# Patient Record
Sex: MALE | Race: WHITE | NOT HISPANIC OR LATINO | Employment: FULL TIME | URBAN - METROPOLITAN AREA
[De-identification: names, ages, dates, MRNs, and addresses within clinical notes are randomized per-mention and may not be internally consistent; named-entity substitution may affect disease eponyms.]

---

## 2017-01-03 ENCOUNTER — HOSPITAL ENCOUNTER (OUTPATIENT)
Dept: RADIOLOGY | Facility: HOSPITAL | Age: 72
Discharge: HOME | End: 2017-01-03
Attending: SURGERY
Payer: MEDICARE

## 2017-01-03 ENCOUNTER — OFFICE VISIT (OUTPATIENT)
Dept: SURGERY | Facility: CLINIC | Age: 72
End: 2017-01-03
Payer: MEDICARE

## 2017-01-03 VITALS
SYSTOLIC BLOOD PRESSURE: 120 MMHG | HEART RATE: 56 BPM | BODY MASS INDEX: 32.73 KG/M2 | WEIGHT: 255 LBS | TEMPERATURE: 99 F | DIASTOLIC BLOOD PRESSURE: 74 MMHG | HEIGHT: 74 IN

## 2017-01-03 DIAGNOSIS — K42.9 UMBILICAL HERNIA WITHOUT OBSTRUCTION AND WITHOUT GANGRENE: ICD-10-CM

## 2017-01-03 DIAGNOSIS — N18.30 CHRONIC KIDNEY DISEASE, STAGE III (MODERATE): ICD-10-CM

## 2017-01-03 DIAGNOSIS — I10 ESSENTIAL HYPERTENSION: ICD-10-CM

## 2017-01-03 DIAGNOSIS — Z01.818 PREOP EXAMINATION: ICD-10-CM

## 2017-01-03 DIAGNOSIS — K40.90 LEFT INGUINAL HERNIA: ICD-10-CM

## 2017-01-03 DIAGNOSIS — E78.5 HYPERLIPIDEMIA, UNSPECIFIED HYPERLIPIDEMIA TYPE: ICD-10-CM

## 2017-01-03 DIAGNOSIS — Z01.818 PREOP EXAMINATION: Primary | ICD-10-CM

## 2017-01-03 PROCEDURE — 71020 XR CHEST PA AND LATERAL: CPT | Mod: TC

## 2017-01-03 PROCEDURE — 1159F MED LIST DOCD IN RCRD: CPT | Mod: S$GLB,,, | Performed by: SURGERY

## 2017-01-03 PROCEDURE — 3074F SYST BP LT 130 MM HG: CPT | Mod: S$GLB,,, | Performed by: SURGERY

## 2017-01-03 PROCEDURE — 99203 OFFICE O/P NEW LOW 30 MIN: CPT | Mod: S$GLB,,, | Performed by: SURGERY

## 2017-01-03 PROCEDURE — 99999 PR PBB SHADOW E&M-NEW PATIENT-LVL IV: CPT | Mod: PBBFAC,,, | Performed by: SURGERY

## 2017-01-03 PROCEDURE — 1157F ADVNC CARE PLAN IN RCRD: CPT | Mod: S$GLB,,, | Performed by: SURGERY

## 2017-01-03 PROCEDURE — 1160F RVW MEDS BY RX/DR IN RCRD: CPT | Mod: S$GLB,,, | Performed by: SURGERY

## 2017-01-03 PROCEDURE — 3078F DIAST BP <80 MM HG: CPT | Mod: S$GLB,,, | Performed by: SURGERY

## 2017-01-03 PROCEDURE — 1126F AMNT PAIN NOTED NONE PRSNT: CPT | Mod: S$GLB,,, | Performed by: SURGERY

## 2017-01-03 PROCEDURE — 71020 XR CHEST PA AND LATERAL: CPT | Mod: 26,,, | Performed by: RADIOLOGY

## 2017-01-03 RX ORDER — SIMVASTATIN 40 MG/1
40 TABLET, FILM COATED ORAL EVERY MORNING
COMMUNITY

## 2017-01-03 RX ORDER — ATENOLOL 25 MG/1
TABLET ORAL
Refills: 1 | COMMUNITY
Start: 2016-12-23

## 2017-01-03 RX ORDER — IRBESARTAN 150 MG/1
TABLET ORAL
Refills: 0 | COMMUNITY
Start: 2016-11-21

## 2017-01-03 RX ORDER — HYDROCHLOROTHIAZIDE 25 MG/1
TABLET ORAL
Refills: 1 | COMMUNITY
Start: 2016-12-08

## 2017-01-03 NOTE — PROGRESS NOTES
History & Physical    SUBJECTIVE:     History of Present Illness:  Patient is a 71 y.o. male presents with left inguinal and umbilical hernias.  The umbilical hernia has been there for years and he noticed his lih 2 years ago.  He does get burning pain in his left groin on straining and relieved with rest.  It swells up when he eats.      Chief Complaint   Patient presents with    Hernia     LIH        Review of patient's allergies indicates:  No Known Allergies    Current Outpatient Prescriptions   Medication Sig Dispense Refill    atenolol (TENORMIN) 25 MG tablet TAKE 1 TABLET BY ORAL ROUTE 1 TIME PER DAY FOR BLOOD PRESSURE  1    hydrochlorothiazide (HYDRODIURIL) 25 MG tablet TAKE 1 TABLET (25 MG) BY ORAL ROUTE ONCE DAILY  1    irbesartan (AVAPRO) 150 MG tablet TAKE 1 TABLET BY MOUTH 1 TIME PER DAY.  0    simvastatin (ZOCOR) 40 MG tablet Take 40 mg by mouth every evening.       No current facility-administered medications for this visit.        Past Medical History   Diagnosis Date    Disorder of kidney and ureter 2017     Stage 3a CKD    Hyperlipidemia     Hypertension      History reviewed. No pertinent past surgical history.  Family History   Problem Relation Age of Onset    Cancer Mother     Cancer Father      Social History   Substance Use Topics    Smoking status: Never Smoker    Smokeless tobacco: None    Alcohol use None        Review of Systems:  Review of Systems   Constitutional: Positive for unexpected weight change. Negative for fever.        Has weight gain   Respiratory: Negative for cough, chest tightness and shortness of breath.         Can climb a flight of stairs and walks a lot (walks his dogs)   Cardiovascular: Negative for chest pain.   Gastrointestinal: Negative for abdominal pain, constipation, diarrhea, nausea and vomiting.   Genitourinary: Negative for difficulty urinating and dysuria.        2 times notcuria   Musculoskeletal: Positive for back pain.   Skin: Negative for  "rash and wound.   Neurological: Negative for seizures and headaches.   Hematological: Does not bruise/bleed easily.       OBJECTIVE:     Vital Signs (Most Recent)  Temp: 98.5 °F (36.9 °C) (01/03/17 0703)  Pulse: (!) 56 (01/03/17 0703)  BP: 120/74 (01/03/17 0703)  6' 2" (1.88 m)  115.7 kg (255 lb)     Physical Exam:  Physical Exam   Constitutional: He is oriented to person, place, and time. He appears well-developed and well-nourished.   Neck: Normal range of motion. Neck supple.   Cardiovascular: Normal rate, regular rhythm and normal heart sounds.    Pulmonary/Chest: Effort normal and breath sounds normal.   Abdominal: Soft. Normal appearance and bowel sounds are normal. There is no tenderness. A hernia is present. Hernia confirmed positive in the left inguinal area. Hernia confirmed negative in the right inguinal area.       Genitourinary: Testes normal.         Neurological: He is alert and oriented to person, place, and time.   Skin: Skin is warm and dry.   Psychiatric: He has a normal mood and affect. His behavior is normal. Judgment and thought content normal.   Vitals reviewed.      Laboratory  None available    Diagnostic Results:  None available    ASSESSMENT/PLAN:     Scrotal lih, incarcerated.  Moderate umbilical hernia.    PLAN:Plan     Open repair of both hernias with mesh.  Stress test and labs.       "

## 2017-01-03 NOTE — LETTER
[facility] - General Surgery  [address]  [place] [address]  Phone: [phone] January 4, 2017      Arben Kelly MD  [address] [facility] [address]    Patient: Gibson Moreno   MR Number: [id number]   YOB: 1945   Date of Visit: 1/3/2017     Dear Dr. Kelly:    Thank you for referring Gibson Moreno to me for evaluation. Below are the relevant portions of my assessment and plan of care.    Patient presents scrotal LIH, incarcerated. Moderate umbilical hernia.     PLAN:   -  Open repair of both hernias with mesh  -  Stress test and labs     If you have questions, please do not hesitate to call me. I look forward to following Gibson along with you.    Sincerely,      Brandan Watts MD  Section Head - General, Laparoscopic, Bariatric  Acute Care and Oncologic Surgery   - Surgical Weight Loss Program  [facility]    CHING/yessica

## 2017-01-03 NOTE — LETTER
January 3, 2017      Arben Kelly MD  [address] [facility] [address]           [facility] General Surgery  [address]  [place] [address]  Phone: [phone]          Patient: Gibson Moreno   MR Number: [id number]   YOB: 1945   Date of Visit: 1/3/2017       Dear Dr. Arben Kelly:    Thank you for referring Gibson Moreno to me for evaluation. Attached you will find relevant portions of my assessment and plan of care.    If you have questions, please do not hesitate to call me. I look forward to following Gibson Moreno along with you.    Sincerely,    Brandan Watts MD    Enclosure  CC:  No Recipients    If you would like to receive this communication electronically, please contact [email or web address] or [phone] to request more information on [employer] access.    For providers and/or their staff who would like to refer a patient to [facility], please contact us through our one-stop-shop provider referral line, [facility] , at [phone].    If you feel you have received this communication in error or would no longer like to receive these types of communications, please e-mail [email or web address]

## 2017-01-04 ENCOUNTER — TELEPHONE (OUTPATIENT)
Dept: SURGERY | Facility: CLINIC | Age: 72
End: 2017-01-04

## 2017-01-04 NOTE — TELEPHONE ENCOUNTER
----- Message from Brandan Watts MD sent at 1/4/2017  6:26 AM CST -----  Please alert this man's pcp regarding his elevated creatinine.  I am not sure if this is new or old.

## 2017-01-04 NOTE — TELEPHONE ENCOUNTER
Spoke to patient regarding his creatinine levels being slightly elevated and that we will forward his results to Dr. Kelly for his review. Pt V/U. That he may also need to schedule an visit prior to his surgery for clearance.

## 2017-01-10 ENCOUNTER — HOSPITAL ENCOUNTER (OUTPATIENT)
Dept: CARDIOLOGY | Facility: CLINIC | Age: 72
Discharge: HOME | End: 2017-01-10
Payer: MEDICARE

## 2017-01-10 DIAGNOSIS — E78.5 HYPERLIPIDEMIA, UNSPECIFIED HYPERLIPIDEMIA TYPE: ICD-10-CM

## 2017-01-10 DIAGNOSIS — I10 ESSENTIAL HYPERTENSION: ICD-10-CM

## 2017-01-10 DIAGNOSIS — Z01.818 PREOP EXAMINATION: ICD-10-CM

## 2017-01-10 LAB
DIASTOLIC DYSFUNCTION: NO
RETIRED EF AND QEF - SEE NOTES: 60 (ref 55–65)

## 2017-01-10 PROCEDURE — 93351 STRESS TTE COMPLETE: CPT | Mod: S$GLB,,, | Performed by: INTERNAL MEDICINE

## 2017-01-10 PROCEDURE — 93321 DOPPLER ECHO F-UP/LMTD STD: CPT | Mod: S$GLB,,, | Performed by: INTERNAL MEDICINE

## 2017-01-10 PROCEDURE — 96372 THER/PROPH/DIAG INJ SC/IM: CPT | Mod: 59,S$GLB,, | Performed by: INTERNAL MEDICINE

## 2017-01-11 ENCOUNTER — TELEPHONE (OUTPATIENT)
Dept: SURGERY | Facility: CLINIC | Age: 72
End: 2017-01-11

## 2017-01-11 NOTE — TELEPHONE ENCOUNTER
----- Message from Evangelista Reynolds sent at 1/11/2017  8:02 AM CST -----  Pt needs some instructions on what to do regarding catheter that was placed last night. Pt was seen at [facility]. Please call pt regarding this at [phone]

## 2017-01-11 NOTE — TELEPHONE ENCOUNTER
Returned pt call-pt states after his stress test yesterday morning and that evening he started with urinary retention and ended up in the ER with a catheter.  Pt states he is wanting to know what to do since he leaves on a business trip tonight and won't be back until Friday.    Notified pt that i would talk with Dr. Watts and also try to get him set up with an appt today to see a urologist.    Pt verbalizes understanding and will call back with any other questions or concerns.

## 2017-01-13 ENCOUNTER — TELEPHONE (OUTPATIENT)
Dept: SURGERY | Facility: CLINIC | Age: 72
End: 2017-01-13

## 2017-01-15 RX ORDER — MULTIVITAMIN
1 TABLET ORAL EVERY MORNING
COMMUNITY

## 2017-01-15 RX ORDER — CIPROFLOXACIN 500 MG/1
500 TABLET ORAL 2 TIMES DAILY
COMMUNITY

## 2017-01-15 NOTE — PRE-PROCEDURE INSTRUCTIONS
Spoke with Patient.  NPO, medication, and pre-op instructions reviewed.  Denies previous problems with Anesthesia.  Stated that he had Urinary Retention after having a Stress Test and had to go to the ED where he had an in and out catheterization and was started on Cipro 500 mg twice a day.  Verbalized understanding of instructions.

## 2017-01-16 ENCOUNTER — SURGERY (OUTPATIENT)
Age: 72
End: 2017-01-16

## 2017-01-16 ENCOUNTER — ANESTHESIA (OUTPATIENT)
Dept: SURGERY | Facility: HOSPITAL | Age: 72
End: 2017-01-16
Payer: MEDICARE

## 2017-01-16 ENCOUNTER — ANESTHESIA EVENT (OUTPATIENT)
Dept: SURGERY | Facility: HOSPITAL | Age: 72
End: 2017-01-16
Payer: MEDICARE

## 2017-01-16 ENCOUNTER — HOSPITAL ENCOUNTER (OUTPATIENT)
Facility: HOSPITAL | Age: 72
Discharge: HOME | End: 2017-01-16
Attending: SURGERY | Admitting: SURGERY
Payer: MEDICARE

## 2017-01-16 VITALS
HEIGHT: 74 IN | DIASTOLIC BLOOD PRESSURE: 70 MMHG | WEIGHT: 255 LBS | TEMPERATURE: 98 F | RESPIRATION RATE: 14 BRPM | OXYGEN SATURATION: 99 % | HEART RATE: 56 BPM | SYSTOLIC BLOOD PRESSURE: 118 MMHG | BODY MASS INDEX: 32.73 KG/M2

## 2017-01-16 DIAGNOSIS — K40.90 INGUINAL HERNIA UNILATERAL, NON-RECURRENT: ICD-10-CM

## 2017-01-16 DIAGNOSIS — K40.90 LEFT INGUINAL HERNIA: Primary | ICD-10-CM

## 2017-01-16 DIAGNOSIS — K42.9 UMBILICAL HERNIA WITHOUT OBSTRUCTION AND WITHOUT GANGRENE: ICD-10-CM

## 2017-01-16 PROCEDURE — 25000003 PHARM REV CODE 250: Performed by: SURGERY

## 2017-01-16 PROCEDURE — 36000707: Performed by: SURGERY

## 2017-01-16 PROCEDURE — 63600175 PHARM REV CODE 636 W HCPCS: Performed by: ANESTHESIOLOGY

## 2017-01-16 PROCEDURE — 71000039 HC RECOVERY, EACH ADD'L HOUR: Performed by: SURGERY

## 2017-01-16 PROCEDURE — 36000706: Performed by: SURGERY

## 2017-01-16 PROCEDURE — D9220A PRA ANESTHESIA: Mod: ANES,,, | Performed by: ANESTHESIOLOGY

## 2017-01-16 PROCEDURE — D9220A PRA ANESTHESIA: Mod: CRNA,,, | Performed by: NURSE ANESTHETIST, CERTIFIED REGISTERED

## 2017-01-16 PROCEDURE — 37000008 HC ANESTHESIA 1ST 15 MINUTES: Performed by: SURGERY

## 2017-01-16 PROCEDURE — 25000003 PHARM REV CODE 250: Performed by: NURSE ANESTHETIST, CERTIFIED REGISTERED

## 2017-01-16 PROCEDURE — 71000016 HC POSTOP RECOV ADDL HR: Performed by: SURGERY

## 2017-01-16 PROCEDURE — 25000003 PHARM REV CODE 250: Performed by: ANESTHESIOLOGY

## 2017-01-16 PROCEDURE — 71000033 HC RECOVERY, INTIAL HOUR: Performed by: SURGERY

## 2017-01-16 PROCEDURE — 37000009 HC ANESTHESIA EA ADD 15 MINS: Performed by: SURGERY

## 2017-01-16 PROCEDURE — 63600175 PHARM REV CODE 636 W HCPCS: Performed by: NURSE ANESTHETIST, CERTIFIED REGISTERED

## 2017-01-16 PROCEDURE — 71000015 HC POSTOP RECOV 1ST HR: Performed by: SURGERY

## 2017-01-16 PROCEDURE — C1781 MESH (IMPLANTABLE): HCPCS | Performed by: SURGERY

## 2017-01-16 DEVICE — MESH PROLENE 3INX6IN 6/BX: Type: IMPLANTABLE DEVICE | Site: UMBILICAL | Status: FUNCTIONAL

## 2017-01-16 RX ORDER — SODIUM CHLORIDE 9 MG/ML
INJECTION, SOLUTION INTRAVENOUS CONTINUOUS PRN
Status: DISCONTINUED | OUTPATIENT
Start: 2017-01-16 | End: 2017-01-16

## 2017-01-16 RX ORDER — SUCCINYLCHOLINE CHLORIDE 20 MG/ML
INJECTION INTRAMUSCULAR; INTRAVENOUS
Status: DISCONTINUED | OUTPATIENT
Start: 2017-01-16 | End: 2017-01-16

## 2017-01-16 RX ORDER — LORAZEPAM 2 MG/ML
0.25 INJECTION INTRAMUSCULAR EVERY 10 MIN PRN
Status: DISCONTINUED | OUTPATIENT
Start: 2017-01-16 | End: 2017-01-16 | Stop reason: HOSPADM

## 2017-01-16 RX ORDER — HYDROMORPHONE HYDROCHLORIDE 1 MG/ML
0.2 INJECTION, SOLUTION INTRAMUSCULAR; INTRAVENOUS; SUBCUTANEOUS EVERY 5 MIN PRN
Status: DISCONTINUED | OUTPATIENT
Start: 2017-01-16 | End: 2017-01-16 | Stop reason: HOSPADM

## 2017-01-16 RX ORDER — PROPOFOL 10 MG/ML
VIAL (ML) INTRAVENOUS
Status: DISCONTINUED | OUTPATIENT
Start: 2017-01-16 | End: 2017-01-16

## 2017-01-16 RX ORDER — OXYCODONE AND ACETAMINOPHEN 5; 325 MG/1; MG/1
1 TABLET ORAL EVERY 4 HOURS PRN
Status: DISCONTINUED | OUTPATIENT
Start: 2017-01-16 | End: 2017-01-16 | Stop reason: HOSPADM

## 2017-01-16 RX ORDER — SODIUM CHLORIDE 9 MG/ML
INJECTION, SOLUTION INTRAVENOUS CONTINUOUS
Status: DISCONTINUED | OUTPATIENT
Start: 2017-01-16 | End: 2017-01-16 | Stop reason: HOSPADM

## 2017-01-16 RX ORDER — BUPIVACAINE HYDROCHLORIDE 2.5 MG/ML
INJECTION, SOLUTION EPIDURAL; INFILTRATION; INTRACAUDAL
Status: DISCONTINUED | OUTPATIENT
Start: 2017-01-16 | End: 2017-01-16 | Stop reason: HOSPADM

## 2017-01-16 RX ORDER — MIDAZOLAM HYDROCHLORIDE 5 MG/ML
INJECTION INTRAMUSCULAR; INTRAVENOUS
Status: DISCONTINUED | OUTPATIENT
Start: 2017-01-16 | End: 2017-01-16

## 2017-01-16 RX ORDER — SODIUM CHLORIDE 0.9 % (FLUSH) 0.9 %
3 SYRINGE (ML) INJECTION
Status: DISCONTINUED | OUTPATIENT
Start: 2017-01-16 | End: 2017-01-16 | Stop reason: HOSPADM

## 2017-01-16 RX ORDER — GLYCOPYRROLATE 0.2 MG/ML
INJECTION INTRAMUSCULAR; INTRAVENOUS
Status: DISCONTINUED | OUTPATIENT
Start: 2017-01-16 | End: 2017-01-16

## 2017-01-16 RX ORDER — LIDOCAINE HYDROCHLORIDE 10 MG/ML
1 INJECTION, SOLUTION EPIDURAL; INFILTRATION; INTRACAUDAL; PERINEURAL ONCE
Status: COMPLETED | OUTPATIENT
Start: 2017-01-16 | End: 2017-01-16

## 2017-01-16 RX ORDER — OXYCODONE AND ACETAMINOPHEN 5; 325 MG/1; MG/1
1 TABLET ORAL EVERY 4 HOURS PRN
Qty: 51 TABLET | Refills: 0 | Status: SHIPPED | OUTPATIENT
Start: 2017-01-16

## 2017-01-16 RX ORDER — NEOSTIGMINE METHYLSULFATE 1 MG/ML
INJECTION, SOLUTION INTRAVENOUS
Status: DISCONTINUED | OUTPATIENT
Start: 2017-01-16 | End: 2017-01-16

## 2017-01-16 RX ORDER — LIDOCAINE HCL/PF 100 MG/5ML
SYRINGE (ML) INTRAVENOUS
Status: DISCONTINUED | OUTPATIENT
Start: 2017-01-16 | End: 2017-01-16

## 2017-01-16 RX ORDER — CEFAZOLIN SODIUM 1 G/3ML
INJECTION, POWDER, FOR SOLUTION INTRAMUSCULAR; INTRAVENOUS
Status: DISCONTINUED | OUTPATIENT
Start: 2017-01-16 | End: 2017-01-16

## 2017-01-16 RX ORDER — DEXAMETHASONE SODIUM PHOSPHATE 4 MG/ML
INJECTION, SOLUTION INTRA-ARTICULAR; INTRALESIONAL; INTRAMUSCULAR; INTRAVENOUS; SOFT TISSUE
Status: DISCONTINUED | OUTPATIENT
Start: 2017-01-16 | End: 2017-01-16

## 2017-01-16 RX ORDER — ONDANSETRON 2 MG/ML
4 INJECTION INTRAMUSCULAR; INTRAVENOUS DAILY PRN
Status: DISCONTINUED | OUTPATIENT
Start: 2017-01-16 | End: 2017-01-16 | Stop reason: HOSPADM

## 2017-01-16 RX ORDER — BACITRACIN 50000 [IU]/1
INJECTION, POWDER, FOR SOLUTION INTRAMUSCULAR
Status: DISCONTINUED | OUTPATIENT
Start: 2017-01-16 | End: 2017-01-16 | Stop reason: HOSPADM

## 2017-01-16 RX ORDER — MEPERIDINE HYDROCHLORIDE 50 MG/ML
12.5 INJECTION INTRAMUSCULAR; INTRAVENOUS; SUBCUTANEOUS EVERY 10 MIN PRN
Status: DISCONTINUED | OUTPATIENT
Start: 2017-01-16 | End: 2017-01-16 | Stop reason: HOSPADM

## 2017-01-16 RX ORDER — ROCURONIUM BROMIDE 10 MG/ML
INJECTION, SOLUTION INTRAVENOUS
Status: DISCONTINUED | OUTPATIENT
Start: 2017-01-16 | End: 2017-01-16

## 2017-01-16 RX ORDER — FENTANYL CITRATE 50 UG/ML
INJECTION, SOLUTION INTRAMUSCULAR; INTRAVENOUS
Status: DISCONTINUED | OUTPATIENT
Start: 2017-01-16 | End: 2017-01-16

## 2017-01-16 RX ORDER — ONDANSETRON HYDROCHLORIDE 2 MG/ML
INJECTION, SOLUTION INTRAMUSCULAR; INTRAVENOUS
Status: DISCONTINUED | OUTPATIENT
Start: 2017-01-16 | End: 2017-01-16

## 2017-01-16 RX ADMIN — FENTANYL CITRATE 50 MCG: 50 INJECTION, SOLUTION INTRAMUSCULAR; INTRAVENOUS at 09:01

## 2017-01-16 RX ADMIN — HYDROMORPHONE HYDROCHLORIDE 0.2 MG: 1 INJECTION, SOLUTION INTRAMUSCULAR; INTRAVENOUS; SUBCUTANEOUS at 11:01

## 2017-01-16 RX ADMIN — SODIUM CHLORIDE: 0.9 INJECTION, SOLUTION INTRAVENOUS at 08:01

## 2017-01-16 RX ADMIN — MIDAZOLAM 2 MG: 5 INJECTION INTRAMUSCULAR; INTRAVENOUS at 08:01

## 2017-01-16 RX ADMIN — LIDOCAINE HYDROCHLORIDE 0.2 MG: 10 INJECTION, SOLUTION EPIDURAL; INFILTRATION; INTRACAUDAL; PERINEURAL at 08:01

## 2017-01-16 RX ADMIN — BACITRACIN 50000 UNITS: 50000 INJECTION, POWDER, LYOPHILIZED, FOR SOLUTION INTRAMUSCULAR at 09:01

## 2017-01-16 RX ADMIN — ROCURONIUM BROMIDE 10 MG: 10 INJECTION, SOLUTION INTRAVENOUS at 09:01

## 2017-01-16 RX ADMIN — DEXAMETHASONE SODIUM PHOSPHATE 4 MG: 4 INJECTION, SOLUTION INTRAMUSCULAR; INTRAVENOUS at 09:01

## 2017-01-16 RX ADMIN — ROCURONIUM BROMIDE 20 MG: 10 INJECTION, SOLUTION INTRAVENOUS at 08:01

## 2017-01-16 RX ADMIN — OXYCODONE HYDROCHLORIDE AND ACETAMINOPHEN 1 TABLET: 5; 325 TABLET ORAL at 11:01

## 2017-01-16 RX ADMIN — BUPIVACAINE HYDROCHLORIDE 15 ML: 2.5 INJECTION, SOLUTION EPIDURAL; INFILTRATION; INTRACAUDAL; PERINEURAL at 10:01

## 2017-01-16 RX ADMIN — CEFAZOLIN 2 G: 1 INJECTION, POWDER, FOR SOLUTION INTRAVENOUS at 08:01

## 2017-01-16 RX ADMIN — GLYCOPYRROLATE 0.4 MG: 0.2 INJECTION, SOLUTION INTRAMUSCULAR; INTRAVENOUS at 10:01

## 2017-01-16 RX ADMIN — ONDANSETRON 4 MG: 2 INJECTION, SOLUTION INTRAMUSCULAR; INTRAVENOUS at 09:01

## 2017-01-16 RX ADMIN — ROCURONIUM BROMIDE 10 MG: 10 INJECTION, SOLUTION INTRAVENOUS at 08:01

## 2017-01-16 RX ADMIN — FENTANYL CITRATE 100 MCG: 50 INJECTION, SOLUTION INTRAMUSCULAR; INTRAVENOUS at 08:01

## 2017-01-16 RX ADMIN — PROPOFOL 180 MG: 10 INJECTION, EMULSION INTRAVENOUS at 08:01

## 2017-01-16 RX ADMIN — SUCCINYLCHOLINE CHLORIDE 140 MG: 20 INJECTION, SOLUTION INTRAMUSCULAR; INTRAVENOUS at 08:01

## 2017-01-16 RX ADMIN — NEOSTIGMINE METHYLSULFATE 4 MG: 1 INJECTION INTRAVENOUS at 10:01

## 2017-01-16 RX ADMIN — BACITRACIN 50000 UNITS: 50000 INJECTION, POWDER, LYOPHILIZED, FOR SOLUTION INTRAMUSCULAR at 10:01

## 2017-01-16 RX ADMIN — LIDOCAINE HYDROCHLORIDE 100 MG: 20 INJECTION, SOLUTION INTRAVENOUS at 08:01

## 2017-01-16 NOTE — DISCHARGE SUMMARY
[facility]  Brief Operative Note     SUMMARY     Surgery Date: 1/16/2017     Surgeon(s) and Role:     * Rajwinder West MD - Resident - Assisting     * Brandan Watts MD - Primary        Pre-op Diagnosis:  Left inguinal hernia [K40.90]  Umbilical hernia without obstruction and without gangrene [K42.9]    Post-op Diagnosis:  Post-Op Diagnosis Codes:     * Left inguinal hernia [K40.90]     * Umbilical hernia without obstruction and without gangrene [K42.9]    Procedure(s) (LRB):  REPAIR-HERNIA-INGUINAL-OPEN-LEFT w/ mesh (Left)  REPAIR-HERNIA-UMBILICAL-OPEN w/ mesh (N/A)    Anesthesia: General    Description of the findings of the procedure: moderate sized direct and indirect left inguinal hernia, 2 cm umbilical hernia repaired with mesh    Findings/Key Components: see op note    Estimated Blood Loss: * No values recorded between 1/16/2017  9:06 AM and 1/16/2017 10:43 AM *         Specimens:   Specimen     None          Discharge Note    SUMMARY     Admit Date: 1/16/2017    Discharge Date and Time: 1/16/2017 10:43 AM    Hospital Course (synopsis of major diagnoses, care, treatment, and services provided during the course of the hospital stay): 72 yo M with umbilical hernia and LIH here for elective repair. He tolerated the procedure well. His pain was controlled. He voided appropriately. He was discharged to home.      Final Diagnosis: Post-Op Diagnosis Codes:     * Left inguinal hernia [K40.90]     * Umbilical hernia without obstruction and without gangrene [K42.9]    Disposition: Home or Self Care    Follow Up/Patient Instructions:     Medications:  Reconciled Home Medications:   Current Discharge Medication List      START taking these medications    Details   docusate sodium (COLACE) 50 MG capsule Take 1 capsule (50 mg total) by mouth 2 (two) times daily.  Refills: 0      oxycodone-acetaminophen (PERCOCET) 5-325 mg per tablet Take 1 tablet by mouth every 4 (four) hours as  needed.  Qty: 51 tablet, Refills: 0         CONTINUE these medications which have NOT CHANGED    Details   atenolol (TENORMIN) 25 MG tablet TAKE 1 TABLET BY ORAL ROUTE 1 TIME PER DAY FOR BLOOD PRESSURE, morning.  Refills: 1      ciprofloxacin HCl (CIPRO) 500 MG tablet Take 500 mg by mouth 2 (two) times daily.      hydrochlorothiazide (HYDRODIURIL) 25 MG tablet TAKE 1 TABLET (25 MG) BY ORAL ROUTE ONCE DAILY, morning  Refills: 1      irbesartan (AVAPRO) 150 MG tablet TAKE 1 TABLET BY MOUTH 1 TIME PER DAY, morning  Refills: 0      multivitamin (ONE DAILY MULTIVITAMIN) per tablet Take 1 tablet by mouth every morning.      simvastatin (ZOCOR) 40 MG tablet Take 40 mg by mouth every morning.              Discharge Procedure Orders  Diet general     Other restrictions (specify):   Order Comments: No lifting more than 10 lbs for 6 weeks.   No driving while still taking pain medications daily.   Take a stool softener while taking pain medications daily.   Ok to shower in 48 hours when dressing removed     Call MD for:  temperature >100.4     Call MD for:  persistent nausea and vomiting or diarrhea     Call MD for:  severe uncontrolled pain     Call MD for:  redness, tenderness, or signs of infection (pain, swelling, redness, odor or green/yellow discharge around incision site)     Call MD for:  difficulty breathing or increased cough     Call MD for:  severe persistent headache     Call MD for:  worsening rash     Call MD for:  persistent dizziness, light-headedness, or visual disturbances     Call MD for:  increased confusion or weakness     Remove dressing in 48 hours   Order Comments: Remove outer dressing.  Keep steri-strips (white tape) intact over wound, they will be removed in clinic. If they fall off before that's ok       Follow-up Information     Follow up with Brandan Watts MD In 2 weeks.    Specialties:  General Surgery, Bariatrics    Contact information:    [address]  [facility]  [address]  [phone]

## 2017-01-16 NOTE — PLAN OF CARE
Problem: Patient Care Overview  Goal: Plan of Care Review  Outcome: Outcome(s) achieved Date Met:  01/16/17    Vitals stable, no complaints from patient.Consents in chart. Patient verbalized understanding discharge instructions.

## 2017-01-16 NOTE — BRIEF OP NOTE
Operative Note       Surgery Date: 1/16/2017     Surgeon(s) and Role:     * Brandan Watts MD - Primary     * Rajwinder West MD - Resident - Assisting    Pre-op Diagnosis:  Left inguinal hernia [K40.90]  Umbilical hernia without obstruction and without gangrene [K42.9]    Post-op Diagnosis:  Left inguinal hernia [K40.90]  Umbilical hernia without obstruction and without gangrene [K42.9]    Procedure(s) (LRB):  REPAIR-HERNIA-INGUINAL-OPEN-LEFT w/ mesh (Left)  REPAIR-HERNIA-UMBILICAL-OPEN w/ mesh (N/A)    Anesthesia: General    Procedure in Detail/Findings:  Large direct, indirect hernias with lipoma of cord.  Repair with mesh.  3 cm umbilical hernia, repair with mesh.    Estimated Blood Loss: Minimal           Specimens     None        Implants:   Implant Name Type Inv. Item Serial No.  Lot No. LRB No. Used   MESH PROLENE [id number] 6/BX - [id number]  MESH PROLENE [id number] 6/BX  [employer] [id number] Left 1   MESH PROLENE [id number] 6/BX - [id number]   MESH PROLENE [id number] 6/BX   [employer] [id number] N/A 1              Disposition: PACU - hemodynamically stable.           Condition: Good    Attestation:  I was present and scrubbed for the entire procedure.

## 2017-01-16 NOTE — ANESTHESIA POSTPROCEDURE EVALUATION
"Anesthesia Post Evaluation    Patient: Gibson Moreno    Procedure(s) Performed: Procedure(s) (LRB):  REPAIR-HERNIA-INGUINAL-OPEN-LEFT w/ mesh (Left)  REPAIR-HERNIA-UMBILICAL-OPEN w/ mesh (N/A)    Final Anesthesia Type: general  Patient location during evaluation: PACU  Patient participation: Yes- Able to Participate  Level of consciousness: awake and alert and oriented  Post-procedure vital signs: reviewed and stable  Pain management: adequate  Airway patency: patent  PONV status at discharge: No PONV  Anesthetic complications: no      Cardiovascular status: stable  Respiratory status: unassisted, spontaneous ventilation and room air  Hydration status: euvolemic  Follow-up not needed.        Visit Vitals    /66 (BP Location: Left arm, Patient Position: Lying, BP Method: Automatic)    Pulse (!) 55    Temp 36.5 °C (97.7 °F) (Oral)    Resp 10    Ht 6' 2" (1.88 m)    Wt 115.7 kg (255 lb)    SpO2 95%    BMI 32.74 kg/m2       Pain/Mario Score: Pain Assessment Performed: Yes (1/16/2017 10:45 AM)  Presence of Pain: other (see comments) (Patient states pain tolerable) (1/16/2017 11:30 AM)  Pain Rating Prior to Med Admin: 3 (1/16/2017 11:21 AM)  Pain Rating Post Med Admin: 2 (1/16/2017 11:30 AM)  Mario Score: 10 (1/16/2017 11:21 AM)      "

## 2017-01-16 NOTE — ANESTHESIA PREPROCEDURE EVALUATION
01/16/2017  Gibson Moreno is a 71 y.o., male.  Pre-operative evaluation for Procedure(s) (LRB):  REPAIR-HERNIA-INGUINAL-OPEN-LEFT w/ mesh (Left)  REPAIR-HERNIA-UMBILICAL-OPEN w/ mesh (N/A)    Review of patient's allergies indicates:  No Known Allergies    No current facility-administered medications on file prior to encounter.      Current Outpatient Prescriptions on File Prior to Encounter   Medication Sig Dispense Refill    atenolol (TENORMIN) 25 MG tablet TAKE 1 TABLET BY ORAL ROUTE 1 TIME PER DAY FOR BLOOD PRESSURE, morning.  1    hydrochlorothiazide (HYDRODIURIL) 25 MG tablet TAKE 1 TABLET (25 MG) BY ORAL ROUTE ONCE DAILY, morning  1    irbesartan (AVAPRO) 150 MG tablet TAKE 1 TABLET BY MOUTH 1 TIME PER DAY, morning  0    simvastatin (ZOCOR) 40 MG tablet Take 40 mg by mouth every morning.          Patient Active Problem List   Diagnosis    Left inguinal hernia    Umbilical hernia without obstruction and without gangrene    HLD (hyperlipidemia)    Essential hypertension    Chronic kidney disease, stage III (moderate)       No past surgical history on file.        No results for input(s): HCT in the last 72 hours.  No results for input(s): PLT in the last 72 hours.  No results for input(s): K in the last 72 hours.  No results for input(s): CREATININE in the last 72 hours.  No results for input(s): GLU in the last 72 hours.  Invalid input(s): PT                    [facility] Anesthesia Evaluation         Review of Systems  Anesthesia Hx:  No problems with previous Anesthesia   Social:  Non-Smoker, Alcohol Use    Hematology/Oncology:  Hematology Normal   Oncology Normal     Cardiovascular:   Hypertension, well controlled Denies MI.    Denies Angina. EKG Conclusions:  1. The EKG portion of this study is abnormal but non diagnostic for ischemia at a peak heart rate of 142 bpm (95% of  predicted).  2.  Blood pressure remained stable throughout the protocol (Presenting BP: 141/84 Peak BP: 208/95).  3. No significant arrhythmias were present.  4. There were no symptoms of chest discomfort or significant dyspnea throughout the protocol.  Conclusions/Recommendations:  1 - Normal left ventricular systolic function (EF 60-65%).  2 - Normal left ventricular diastolic function.  3 - Normal right ventricular systolic function .  No evidence of stress induced myocardial ischemia    Very short lived atrial fib, has never been on anticoagulation,  Last bout 3-5 years ago.    Walks one mile without difficulty, rides bike 3-4 miles.   Pulmonary:  Pulmonary Normal    Renal/:   Chronic Renal Disease, CRI    Neurological:   Denies TIA. Denies CVA. Denies Seizures.    Endocrine:   Denies Diabetes.        Physical Exam  General:  Well nourished    Airway/Jaw/Neck:  Airway Findings: Mouth Opening: Normal Tongue: Normal  General Airway Assessment: Adult, Average  Mallampati: II  TM Distance: Normal, at least 6 cm  Jaw/Neck Findings:  Neck ROM: Normal ROM       Chest/Lungs:  Chest/Lungs Findings: Clear to auscultation     Heart/Vascular:  Heart Findings: Rate: Normal  Rhythm: Regular Rhythm  Sounds: Normal        Mental Status:  Mental Status Findings:  Cooperative, Alert and Oriented         Anesthesia Plan  Type of Anesthesia, risks & benefits discussed:  Anesthesia Type:  general  Patient's Preference:   Intra-op Monitoring Plan:   Intra-op Monitoring Plan Comments:   Post Op Pain Control Plan:   Post Op Pain Control Plan Comments: As per surgeon's plan  Induction:   IV  Beta Blocker:  Patient is not currently on a Beta-Blocker (No further documentation required).       Informed Consent: Patient understands risks and agrees with Anesthesia plan.  Questions answered. Anesthesia consent signed with patient.  ASA Score: 2     Day of Surgery Review of History & Physical:    H&P update referred to the surgeon.         Ready For Surgery  From Anesthesia Perspective.

## 2017-01-16 NOTE — ANESTHESIA RELEASE NOTE
"Anesthesia Release from PACU Note    Patient: Gibson Moreno    Procedure(s) Performed: Procedure(s) (LRB):  REPAIR-HERNIA-INGUINAL-OPEN-LEFT w/ mesh (Left)  REPAIR-HERNIA-UMBILICAL-OPEN w/ mesh (N/A)    Anesthesia type: GEN    Post pain: Adequate analgesia reported    Post assessment: no apparent anesthetic complications, tolerated procedure well and no evidence of recall    Post vital signs:   Visit Vitals    /66 (BP Location: Left arm, Patient Position: Lying, BP Method: Automatic)    Pulse (!) 55    Temp 36.5 °C (97.7 °F) (Oral)    Resp 10    Ht 6' 2" (1.88 m)    Wt 115.7 kg (255 lb)    SpO2 95%    BMI 32.74 kg/m2       Level of consciousness: awake, alert and oriented    Nausea/Vomiting: no nausea/no vomiting    Complications: none    Airway Patency: patent    Respiratory: unassisted, spontaneous ventilation, room air    Cardiovascular: stable and blood pressure at baseline    Hydration: euvolemic    "

## 2017-01-16 NOTE — OP NOTE
DATE OF PROCEDURE: 1/16/2017    PREOPERATIVE DIAGNOSIS:   1. Left inguinal hernia  2. Umbilical hernia    POSTOPERATIVE DIAGNOSIS:   1. Left inguinal hernia  2. Umbilical hernia    PROCEDURES PERFORMED:  1. Repair left inguinal hernia with mesh  2. Repair umbilical hernia with mesh    ATTENDING SURGEON: Dr Brandan Watts    RESIDENT: Dr Rajwinder West    ANESTHESIA: General    KEY FINDINGS: moderate direct and indirect left inguinal hernia, 2 cm umbilical hernia    ESTIMATED BLOOD LOSS: 5 ml    DRAINS: None    COMPLICATIONS: None    INDICATIONS FOR PROCEDURE: The patient is a 71 y.o. male who presented with symptomatic umbilical and left inguinal hernias. Based on this surgery was indicated.    PROCEDURE IN DETAIL: After informed consent was obtained, the patient was brought to the Operative Theater and placed in in the supine position. After adequate anesthesia the patient was prepped and draped in the usual sterile fashion. A timeout procedure was performed. We marked out an appropriate left inguinal incision and administered Marcaine. After assuring adequate local anesthesia, a 7 cm skin incision was made. Subcutaneous tissue was divided with Bovie electrocautery. This dissection was carried down through Dionne fascia down to the aponeurosis of the external oblique. The aponeurosis of the external oblique was incised in line with its fibers, first with a #15 scalpel and then Metzenbaum scissors, and this incision was extended to the external spermatic ring. The inguinal canal contents were bluntly encircled, first digitally and then with a Penrose drain. We proceeded to identify both an indirect and direct inguinal hernia, which were carefully dissected away from the inguinal canal contents until they could be reduced into the defect. We then had appropriate borders of the inguinal canal identified and decided to repair the defect with a piece of polypropylene mesh. The mesh was cut to fit the defect  appropriately and sewn in place with two running 0 Prolene suture. Medially, the mesh was anchored to the fascia overlying the pubic tubercle and this was run inferiorly along the shelving edge of the inguinal ligament. Medially, a second 0 prolene suture was run from the fascia overlying the pubic tubercle along the conjoined tendon. The tails of the mesh were brought together around the cord structures and an additional 3 sutures were placed through the mesh creating a new internal ring that just admitted the tip of the finger. The mesh was inspected and noted to lie in appropriate position without any redundancy or tension. There was noted to be no tension on the cord structures. The aponeurosis of the external oblique was closed with a running 3-0 Vicryl suture. Dionne fascia was closed with several buried interrupted 3-0 Vicryl sutures and the skin was closed with a running subcuticular 4-0 Vicryl suture.   We then turned our attention to the umbilicus. The navel was injected with Marcaine. A vertical incision was made with the scalpel extending slightly above and below the umbilicus. Subcutaneous tissues were divided with the Bovie clearing off the fascial edges. The hernia contents were reduced into the abdomen. The defect was about 2 cm. The underside was cleared off. The mesh was cut to fit the defect and sewn in with horizontal mattress sutures of 0 Prolene as an underlay. It was a prolene mesh and it was placed in antibiotics before placement. The wound was irrigated and inspected for hemostasis. The fascia was run closed over the mesh with a 2-0 Vicryl. The navel was re-tacked down with 3-0 Vicryl. Skin was closed with a running 4-0 Vicryl subcuticular stitch.   Mastisol, Steri-Strips, and a pressure dressing was applied. Sterile dressing was also placed over the inguinal incision. The patient tolerated the procedure well and was transported to the recovery room in stable condition. Dr Watts was  scrubbed and present for the entire procedure.

## 2017-01-16 NOTE — IP AVS SNAPSHOT
[address]  [place] [address]  Phone: [phone]           I have received a copy of my After Visit Summary and discharge instructions from [facility].    INSTRUCTIONS RECEIVED AND UNDERSTOOD BY:                     Patient/Patient Representative: ________________________________________________________________     Date/Time: ________________________________________________________________                     Instructions Given By: ________________________________________________________________     Date/Time: ________________________________________________________________

## 2017-01-16 NOTE — IP AVS SNAPSHOT
[facility]  [address]  [facility] [address]  Phone: [phone]           Patient Discharge Instructions     Our goal is to set you up for success. This packet includes information on your condition, medications, and your home care. It will help you to care for yourself so you don't get sicker and need to go back to the hospital.     Please ask your nurse if you have any questions.        There are many details to remember when preparing to leave the hospital. Here is what you will need to do:    1. Take your medicine. If you are prescribed medications, review your Medication List in the following pages. You may have new medications to  at the pharmacy and others that you'll need to stop taking. Review the instructions for how and when to take your medications. Talk with your doctor or nurses if you are unsure of what to do.     2. Go to your follow-up appointments. Specific follow-up information is listed in the following pages. Your may be contacted by a transition nurse or clinical provider about future appointments. Be sure we have all of the phone numbers to reach you, if needed. Please contact your provider's office if you are unable to make an appointment.     3. Watch for warning signs. Your doctor or nurse will give you detailed warning signs to watch for and when to call for assistance. These instructions may also include educational information about your condition. If you experience any of warning signs to your health, call your doctor.               [facility]  Unless otherwise directed by your provider, please contact [facility], our nurse care line that is available for 24/7 assistance.     [phone] (toll-free)    Registered nurses in the [facility] provide clinical advisement, health education, appointment booking, and other advisory services.                    ** Verify the list of medication(s) below is accurate and up  to date. Carry this with you in case of emergency. If your medications have changed, please notify your healthcare provider.             Medication List      START taking these medications        Additional Info                      docusate sodium 50 MG capsule   Commonly known as:  COLACE   Refills:  0   Dose:  50 mg    Instructions:  Take 1 capsule (50 mg total) by mouth 2 (two) times daily.     Begin Date    AM    Noon    PM    Bedtime       oxycodone-acetaminophen 5-325 mg per tablet   Commonly known as:  PERCOCET   Quantity:  51 tablet   Refills:  0   Dose:  1 tablet    Last time this was given:  1 tablet on 1/16/2017 11:13 AM   Instructions:  Take 1 tablet by mouth every 4 (four) hours as needed.     Begin Date    AM    Noon    PM    Bedtime         CONTINUE taking these medications        Additional Info                      atenolol 25 MG tablet   Commonly known as:  TENORMIN   Refills:  1    Instructions:  TAKE 1 TABLET BY ORAL ROUTE 1 TIME PER DAY FOR BLOOD PRESSURE, morning.     Begin Date    AM    Noon    PM    Bedtime       ciprofloxacin HCl 500 MG tablet   Commonly known as:  CIPRO   Refills:  0   Dose:  500 mg    Instructions:  Take 500 mg by mouth 2 (two) times daily.     Begin Date    AM    Noon    PM    Bedtime       hydrochlorothiazide 25 MG tablet   Commonly known as:  HYDRODIURIL   Refills:  1    Instructions:  TAKE 1 TABLET (25 MG) BY ORAL ROUTE ONCE DAILY, morning     Begin Date    AM    Noon    PM    Bedtime       irbesartan 150 MG tablet   Commonly known as:  AVAPRO   Refills:  0    Instructions:  TAKE 1 TABLET BY MOUTH 1 TIME PER DAY, morning     Begin Date    AM    Noon    PM    Bedtime       ONE DAILY MULTIVITAMIN per tablet   Refills:  0   Dose:  1 tablet   Generic drug:  multivitamin    Instructions:  Take 1 tablet by mouth every morning.     Begin Date    AM    Noon    PM    Bedtime       simvastatin 40 MG tablet   Commonly known as:  ZOCOR   Refills:  0   Dose:  40 mg     Instructions:  Take 40 mg by mouth every morning.     Begin Date    AM    Noon    PM    Bedtime            Where to Get Your Medications      You can get these medications from any pharmacy     Bring a paper prescription for each of these medications     oxycodone-acetaminophen 5-325 mg per tablet       You don't need a prescription for these medications     docusate sodium 50 MG capsule                  Please bring to all follow up appointments:    1. A copy of your discharge instructions.  2. All medicines you are currently taking in their original bottles.  3. Identification and insurance card.    Please arrive 15 minutes ahead of scheduled appointment time.    Please call 24 hours in advance if you must reschedule your appointment and/or time.        Follow-up Information     Follow up with Brandan Watts MD In 2 weeks.    Specialties:  General Surgery, Bariatrics    Contact information:    Elisa JUAREZ CHRISTOPHER  [facility] [id number] [phone]          Discharge Instructions     Future Orders    Call MD for:  difficulty breathing or increased cough     Call MD for:  increased confusion or weakness     Call MD for:  persistent dizziness, light-headedness, or visual disturbances     Call MD for:  persistent nausea and vomiting or diarrhea     Call MD for:  redness, tenderness, or signs of infection (pain, swelling, redness, odor or green/yellow discharge around incision site)     Call MD for:  severe persistent headache     Call MD for:  severe uncontrolled pain     Call MD for:  temperature >100.4     Call MD for:  worsening rash     Diet general     Questions:    Total calories:      Fat restriction, if any:      Protein restriction, if any:      Na restriction, if any:      Fluid restriction:      Additional restrictions:      Other restrictions (specify):     Comments:    No lifting more than 10 lbs for 6 weeks.   No driving while still taking pain medications daily.   Take a stool softener while  taking pain medications daily.   Ok to shower in 48 hours when dressing removed        Discharge Instructions         Discharge Instructions for Open Hernia Repair  You had a procedure called open hernia repair. Also called a rupture, a hernia is a tear or weakness in the wall of the belly. This weakness may be present at birth. Or it can be caused by the wear and tear of daily living. Hernias may get worse with time or with physical stress. But surgery can help repair the weakness and eliminate symptoms.  Activity after surgery  Recommendations include the following:  · After surgery, take it easy for the rest of the day. If you had general anesthesia, dont use machinery or power tools, drink alcohol, or make any major decisions for at least the first 24 hours.  · Dont drive while you are still taking opioid pain medicine, and dont drive until you are able to step firmly on the brake pedal without hesitation.  · Ask others to help with chores and errands while you recover.  · Dont lift anything heavier than 10 pounds until your healthcare provider says its OK.  · Dont mow the lawn, use a vacuum , or do other strenuous activities until your healthcare provider says its OK.  · Walk as often as you feel able.  · Continue the coughing and deep breathing exercises that you learned in the hospital.  · Ask your healthcare provider when you can expect to return to work.  · Avoid constipation:  ¨ Eat fruits, vegetables, and whole grains.  ¨ Drink 6 to 8 glasses of water a day, unless otherwise instructed.  ¨ Use a laxative or a mild stool softener as instructed by your healthcare provider.  Bandage and incision care  Tips include the following:  · Do not get the bandage or wound wet for 48 hours.  · If strips of tape were used to close your incision, dont pull them off. Let them fall off on their own.  · Remove any gauze bandage in 48 hours.  · Wash your incision with mild soap and water. Pat it dry. Dont  use oils, powders, or lotions on your incision. Do not soak your incision or take tub baths until cleared by your healthcare provider.  Follow-up care  Keep follow-up appointments during your recovery. These allow your healthcare provider to check your progress and make sure youre healing well. You may also need to have your stitches, staples, or bandage removed. During office visits, tell your healthcare provider if you have any new symptoms. And be sure to ask any questions you have.     When to call your healthcare provider  Call your healthcare provider immediately if you have any of the following:  · A large amount of swelling or bruising (some testicular swelling and bruising is common)  · Bleeding  · Increasing pain  · Increased redness or drainage of the incision  · Fever of 100.4°F (38.0°C) or higher, or as directed by your healthcare provider  · Trouble urinating  · Nausea or vomiting   © 3983-5055 [employer]. [address] [place] [address]. All rights reserved. This information is not intended as a substitute for professional medical care. Always follow your healthcare professional's instructions.        After Hernia Surgery    You can usually go home the same day as surgery. If you had surgery to repair ventral or incisional hernia, you may need to stay in the hospital overnight. To speed healing, take an active role in your recovery. The tips below can help:  Reducing Swelling  Early on, its common for the area around your incision to be swollen, bruised, and sore. To reduce swelling, put an ice pack or bag of frozen peas in a thin towel. Place the towel on the swollen area 3 to 5 times a day for 15 to 20 minutes at a time.  Managing Pain  Take any prescribed pain medications as directed. Be aware that some pain medications can cause constipation. So your doctor may also suggest a laxative or stool softener.  Returning to Normal  You can return to your normal routine as soon  "as you feel able. Just take it easy and follow these guidelines:  · Take short walks to improve circulation.  · Avoid heavy lifting for at least a week.  · Ask your doctor about driving and returning to work.  · You can begin having sex again when you feel ready.  Following Up  Be sure to keep all follow-up appointments with your doctor. These ensure youre healing well. During visits, your stitches, staples, or bandage may be removed.  Call Your Doctor  Call your doctor if you have any of the following:  · A large amount of swelling or bruising (some testicular swelling and bruising is normal)  · Fever over 101°F (38.3°C)  · Increasing pain, redness, bleeding, or drainage from the incision  · Trouble urinating  · Constipation  · Vomiting   © 7770-9144 [employer]. [address] [place] [address]. All rights reserved. This information is not intended as a substitute for professional medical care. Always follow your healthcare professional's instructions.            Primary Diagnosis     Your primary diagnosis was:  Inguinal Hernia      Admission Information     Date & Time Provider Department CSN    1/16/2017  6:47 AM Brandan Watts MD [facility] [id number]      Care Providers     Provider Role Specialty Primary office phone    Brandan Watts MD Attending Provider General Surgery [phone]    Brandan Watts MD Surgeon  General Surgery [phone]      Your Vitals Were     BP Pulse Temp Resp Height Weight    117/69 (BP Location: Left arm, Patient Position: Lying, BP Method: Automatic) 55 97.7 °F (36.5 °C) (Oral) 16 6' 2" (1.88 m) 115.7 kg (255 lb)    SpO2 BMI             98% 32.74 kg/m2         Recent Lab Values     No lab values to display.      Allergies as of 1/16/2017     No Known Allergies      Advance Directives     An advance directive is a document which, in the event you are no longer able to make decisions for yourself, tells your " healthcare team what kind of treatment you do or do not want to receive, or who you would like to make those decisions for you.  If you do not currently have an advance directive, [facility] encourages you to create one.  For more information call:  [phone]), [phone] ([phone]),  or log on to [email or web address].        Language Assistance Services     ATTENTION: Language assistance services are available, free of charge. Please call [phone].      ATENCIÓN: Si habla español, tiene a cheema disposición servicios gratuitos de asistencia lingüística. Llame al [phone].     CHÚ Ý: N?u b?n nói Ti?ng Vi?t, có các d?ch v? h? tr? ngôn ng? mi?n phí dành cho b?n. G?i s? [phone].        Chronic Kindey Disease Education             [facility] Sign-Up     Activating your [facility] account is as easy as 1-2-3!     1) Visit [email or web address], select Sign Up Now, enter this activation code and your date of birth, then select Next.  [email or web address]  Expires: 3/2/2017 12:07 PM      2) Create a username and password to use when you visit [facility] in the future and select a security question in case you lose your password and select Next.    3) Enter your e-mail address and click Sign Up!    Additional Information  If you have questions, please e-mail [email or web address] or call [phone] to talk to our [facility] staff. Remember, [facility] is NOT to be used for urgent needs. For medical emergencies, dial 911.          [facility] complies with applicable Federal civil rights laws and does not discriminate on the basis of race, color, national origin, age, disability, or sex.

## 2017-01-16 NOTE — PLAN OF CARE
Problem: Patient Care Overview  Goal: Plan of Care Review  Outcome: Ongoing (interventions implemented as appropriate)  VSS, see flowsheet for full assessment. Patient states pain tolerable @ 2/10. Dressing dry & intact abd and left lower groin area. Updated spouse via cell phone, notified will note once transferred to phase 2.

## 2017-01-16 NOTE — DISCHARGE INSTRUCTIONS
Discharge Instructions for Open Hernia Repair  You had a procedure called open hernia repair. Also called a rupture, a hernia is a tear or weakness in the wall of the belly. This weakness may be present at birth. Or it can be caused by the wear and tear of daily living. Hernias may get worse with time or with physical stress. But surgery can help repair the weakness and eliminate symptoms.  Activity after surgery  Recommendations include the following:  · After surgery, take it easy for the rest of the day. If you had general anesthesia, dont use machinery or power tools, drink alcohol, or make any major decisions for at least the first 24 hours.  · Dont drive while you are still taking opioid pain medicine, and dont drive until you are able to step firmly on the brake pedal without hesitation.  · Ask others to help with chores and errands while you recover.  · Dont lift anything heavier than 10 pounds until your healthcare provider says its OK.  · Dont mow the lawn, use a vacuum , or do other strenuous activities until your healthcare provider says its OK.  · Walk as often as you feel able.  · Continue the coughing and deep breathing exercises that you learned in the hospital.  · Ask your healthcare provider when you can expect to return to work.  · Avoid constipation:  ¨ Eat fruits, vegetables, and whole grains.  ¨ Drink 6 to 8 glasses of water a day, unless otherwise instructed.  ¨ Use a laxative or a mild stool softener as instructed by your healthcare provider.  Bandage and incision care  Tips include the following:  · Do not get the bandage or wound wet for 48 hours.  · If strips of tape were used to close your incision, dont pull them off. Let them fall off on their own.  · Remove any gauze bandage in 48 hours.  · Wash your incision with mild soap and water. Pat it dry. Dont use oils, powders, or lotions on your incision. Do not soak your incision or take tub baths until cleared by your  healthcare provider.  Follow-up care  Keep follow-up appointments during your recovery. These allow your healthcare provider to check your progress and make sure youre healing well. You may also need to have your stitches, staples, or bandage removed. During office visits, tell your healthcare provider if you have any new symptoms. And be sure to ask any questions you have.     When to call your healthcare provider  Call your healthcare provider immediately if you have any of the following:  · A large amount of swelling or bruising (some testicular swelling and bruising is common)  · Bleeding  · Increasing pain  · Increased redness or drainage of the incision  · Fever of 100.4°F (38.0°C) or higher, or as directed by your healthcare provider  · Trouble urinating  · Nausea or vomiting   © 0749-5657 [employer]. [address] [place] [address], [place] [address]. All rights reserved. This information is not intended as a substitute for professional medical care. Always follow your healthcare professional's instructions.        After Hernia Surgery    You can usually go home the same day as surgery. If you had surgery to repair ventral or incisional hernia, you may need to stay in the hospital overnight. To speed healing, take an active role in your recovery. The tips below can help:  Reducing Swelling  Early on, its common for the area around your incision to be swollen, bruised, and sore. To reduce swelling, put an ice pack or bag of frozen peas in a thin towel. Place the towel on the swollen area 3 to 5 times a day for 15 to 20 minutes at a time.  Managing Pain  Take any prescribed pain medications as directed. Be aware that some pain medications can cause constipation. So your doctor may also suggest a laxative or stool softener.  Returning to Normal  You can return to your normal routine as soon as you feel able. Just take it easy and follow these guidelines:  · Take short walks to improve  circulation.  · Avoid heavy lifting for at least a week.  · Ask your doctor about driving and returning to work.  · You can begin having sex again when you feel ready.  Following Up  Be sure to keep all follow-up appointments with your doctor. These ensure youre healing well. During visits, your stitches, staples, or bandage may be removed.  Call Your Doctor  Call your doctor if you have any of the following:  · A large amount of swelling or bruising (some testicular swelling and bruising is normal)  · Fever over 101°F (38.3°C)  · Increasing pain, redness, bleeding, or drainage from the incision  · Trouble urinating  · Constipation  · Vomiting   © 5024-0427 [employer]. [address] [place] [address], [place] [address]. All rights reserved. This information is not intended as a substitute for professional medical care. Always follow your healthcare professional's instructions.

## 2017-01-16 NOTE — PROGRESS NOTES
Patient awake and alert. Voice no complaints. No s/s of distress noted. Wife at bedside. All questions addressed. Verbalized understanding

## 2017-01-16 NOTE — TRANSFER OF CARE
"Anesthesia Transfer of Care Note    Patient: Gibson Moreno    Procedure(s) Performed: Procedure(s) (LRB):  REPAIR-HERNIA-INGUINAL-OPEN-LEFT w/ mesh (Left)  REPAIR-HERNIA-UMBILICAL-OPEN w/ mesh (N/A)    Patient location: PACU    Anesthesia Type: general    Transport from OR: Transported from OR on room air with adequate spontaneous ventilation. Transported from OR on 6-10 L/min O2 by face mask with adequate spontaneous ventilation    Post pain: adequate analgesia    Post assessment: no apparent anesthetic complications    Post vital signs: stable    Level of consciousness: awake    Nausea/Vomiting: no nausea/vomiting    Complications: none          Last vitals:   Visit Vitals    /63 (BP Location: Left arm, Patient Position: Lying, BP Method: Automatic)    Pulse (!) 48    Temp 36.8 °C (98.2 °F) (Oral)    Resp 15    Ht 6' 2" (1.88 m)    Wt 115.7 kg (255 lb)    SpO2 100%    BMI 32.74 kg/m2     "

## 2017-01-17 ENCOUNTER — HOSPITAL ENCOUNTER (EMERGENCY)
Facility: OTHER | Age: 72
Discharge: HOME | End: 2017-01-17
Attending: EMERGENCY MEDICINE
Payer: MEDICARE

## 2017-01-17 ENCOUNTER — TELEPHONE (OUTPATIENT)
Dept: SURGERY | Facility: CLINIC | Age: 72
End: 2017-01-17

## 2017-01-17 VITALS
BODY MASS INDEX: 33.38 KG/M2 | HEART RATE: 90 BPM | TEMPERATURE: 98 F | SYSTOLIC BLOOD PRESSURE: 225 MMHG | OXYGEN SATURATION: 94 % | RESPIRATION RATE: 20 BRPM | HEIGHT: 74 IN | WEIGHT: 260.13 LBS | DIASTOLIC BLOOD PRESSURE: 100 MMHG

## 2017-01-17 DIAGNOSIS — R33.9 URINARY RETENTION: Primary | ICD-10-CM

## 2017-01-17 LAB
BILIRUB UR QL STRIP: NEGATIVE
CLARITY UR: CLEAR
COLOR UR: YELLOW
GLUCOSE UR QL STRIP: NEGATIVE
HGB UR QL STRIP: ABNORMAL
HYALINE CASTS #/AREA URNS LPF: 1 /LPF
KETONES UR QL STRIP: NEGATIVE
LEUKOCYTE ESTERASE UR QL STRIP: NEGATIVE
MICROSCOPIC COMMENT: ABNORMAL
NITRITE UR QL STRIP: NEGATIVE
PH UR STRIP: 5 [PH] (ref 5–8)
PROT UR QL STRIP: NEGATIVE
RBC #/AREA URNS HPF: 45 /HPF (ref 0–4)
SP GR UR STRIP: 1.02 (ref 1–1.03)
URN SPEC COLLECT METH UR: ABNORMAL
UROBILINOGEN UR STRIP-ACNC: NEGATIVE EU/DL

## 2017-01-17 PROCEDURE — 81000 URINALYSIS NONAUTO W/SCOPE: CPT

## 2017-01-17 PROCEDURE — 51702 INSERT TEMP BLADDER CATH: CPT

## 2017-01-17 PROCEDURE — 99284 EMERGENCY DEPT VISIT MOD MDM: CPT | Mod: 25

## 2017-01-17 PROCEDURE — 87086 URINE CULTURE/COLONY COUNT: CPT

## 2017-01-17 NOTE — ED NOTES
Pt states he voided once immediately after surgery (hernia repair x2) 1/16 ~noon at [facility] - and has not voided since, approximately 14 hours ago. States recent hx of inability to void after procedures - post cardiac stress test, et al and required ford catheter.  In no other distress other than urge to void.

## 2017-01-17 NOTE — TELEPHONE ENCOUNTER
----- Message from Jess Velasco sent at 1/17/2017 12:33 PM CST -----  Contact: self  Pt states he would like to schedule catheter removal w/ .Please call pt  [phone].

## 2017-01-17 NOTE — ED PROVIDER NOTES
"Encounter Date: 1/17/2017    SCRIBE #1 NOTE: I, Briana Hope , am scribing for, and in the presence of, Dr. Humphries.       History     Chief Complaint   Patient presents with    Urinary Retention     hernia surgery yesterday AM, has not urinated since then     Review of patient's allergies indicates:  No Known Allergies  HPI Comments: Time seen by provider: 2:27 AM    This is a 71 y.o. male who presents with complaint of urinary retention. Symptoms began this morning. Pt reports penile and abdominal pain, but denies fever, chills, nausea, vomiting, flank pain, or myalgias. Penile pain is described as a constant "pressure" sensation. Pt noticed symptoms after undergoing surgery, but reports no alleviating factors.     The history is provided by the patient.     Past Medical History   Diagnosis Date    A-fib 2002     required digitalis IV, resolved w no occurence    Disorder of kidney and ureter 2017     Stage 3a CKD    Hyperlipidemia     Hypertension      Past Medical History Pertinent Negatives   Diagnosis Date Noted    Anticoagulant long-term use 1/17/2017    Coronary artery disease 1/3/2017    Deep vein thrombosis 1/3/2017    Diabetes mellitus type I 1/3/2017    Diabetes mellitus, type 2 1/3/2017    Emphysema of lung 1/3/2017    Hyperthyroidism 1/3/2017    Hypothyroidism 1/3/2017     History reviewed. No pertinent past surgical history.  Family History   Problem Relation Age of Onset    Cancer Mother     Cancer Father      Social History   Substance Use Topics    Smoking status: Never Smoker    Smokeless tobacco: None    Alcohol use Yes      Comment: glass of wine occasionally     Review of Systems   Constitutional: Negative for chills and fever.   HENT: Negative for congestion and sore throat.    Eyes: Negative for redness and visual disturbance.   Respiratory: Negative for cough and shortness of breath.    Cardiovascular: Negative for chest pain and palpitations.   Gastrointestinal: Positive for " abdominal pain. Negative for diarrhea, nausea and vomiting.   Genitourinary: Positive for penile pain. Negative for dysuria and flank pain.        Positive for urinary retention.   Musculoskeletal: Negative for back pain and myalgias.   Skin: Negative for rash.   Neurological: Negative for weakness and headaches.   Psychiatric/Behavioral: Negative for confusion.       Physical Exam   Initial Vitals   BP Pulse Resp Temp SpO2   01/17/17 0147 01/17/17 0147 01/17/17 0147 01/17/17 0147 01/17/17 0147   225/100 90 20 98.3 °F (36.8 °C) 94 %     Physical Exam    Nursing note and vitals reviewed.  Constitutional: He appears well-developed and well-nourished. He is not diaphoretic. No distress.   HENT:   Head: Normocephalic and atraumatic.   Right Ear: External ear normal.   Left Ear: External ear normal.   Eyes: EOM are normal. Pupils are equal, round, and reactive to light. Right eye exhibits no discharge. Left eye exhibits no discharge.   Neck: Normal range of motion.   Cardiovascular: Normal rate, regular rhythm and normal heart sounds. Exam reveals no gallop and no friction rub.    No murmur heard.  Pulmonary/Chest: Breath sounds normal. No respiratory distress. He has no wheezes. He has no rhonchi. He has no rales.   Abdominal: Soft. There is tenderness. There is no rebound and no guarding.   Tenderness to palpation to the suprapubic region.    Musculoskeletal: Normal range of motion. He exhibits no edema or tenderness.   Lymphadenopathy:     He has no cervical adenopathy.   Neurological: He is alert and oriented to person, place, and time.   Skin: Skin is warm and dry. No rash and no abscess noted. No erythema. No pallor.   Psychiatric: He has a normal mood and affect. His behavior is normal. Judgment and thought content normal.         ED Course   Procedures  Labs Reviewed   CULTURE, URINE   URINALYSIS             Medical Decision Making:   Clinical Tests:   Lab Tests: Ordered and Reviewed  ED Management:  Patient one  day postop from a hernia repair presents complaining of urinary retention.  Reports he's had this reaction to anesthesia in the past.  Catheter is placed and drains almost 1 L of urine.  Sent for urinalysis.  Negative for infection.  Patient has complete relief of symptoms following catheterization.  Discharge the leg bag intact, provided urology follow-up.  Urine culture sent.    DAVID Humphries M.D.  01/17/2017  4:39 AM              Scribe Attestation:   Scribe #1: I performed the above scribed service and the documentation accurately describes the services I performed. I attest to the accuracy of the note.    Attending Attestation:           Physician Attestation for Scribe:  Physician Attestation Statement for Scribe #1: I, Dr. Humphries, reviewed documentation, as scribed by Briana Sams  in my presence, and it is both accurate and complete.                 ED Course     Clinical Impression:     1. Urinary retention                Praveen Humphries MD  01/17/17 0439

## 2017-01-17 NOTE — TELEPHONE ENCOUNTER
Pt scheduled to see urology office for VT.    Pt aware and ok with appt.      Will call back with any other questions or concerns.

## 2017-01-17 NOTE — ED AVS SNAPSHOT
[facility]  [address] [address]               Gibson Moreno   2017  1:57 AM   ED    Description:  Male : 1945   Department:  [facility]           Your Care was Coordinated By:     Provider Role From To    Praveen Humphries MD Attending Provider 17 0213 --      Reason for Visit     Urinary Retention           Diagnoses this Visit        Comments    Urinary retention    -  Primary       ED Disposition     None           To Do List           Follow-up Information     Follow up with Arben Kelly MD. Schedule an appointment as soon as possible for a visit in 3 days.    Specialty:  Internal Medicine    Contact information:    [address] [facility] [address] [facility] [id number] [phone]          Follow up with Justin Acosta MD. Schedule an appointment as soon as possible for a visit in 3 days.    Specialty:  Urology    Why:  or another urologist    Contact information:    [address]  [facility] [id number] [phone]          Follow up with [facility].    Specialty:  Emergency Medicine    Why:  If symptoms worsen    Contact information:    [address] [facility] [address] [phone]        Call [facility]WE[facility].    Why:  if you'd like a local cardiologist    Contact information:    [address] [facility] [address]  [facility] [id number] [phone]      [facility]     [facility] Nurse Care Line -  Assistance  Registered nurses in the [facility] provide clinical advisement, health education, appointment booking, and other advisory services.  Call for this free service at [phone].             Medications           Message regarding Medications     Verify the changes and/or additions to your medication regime listed below are the same as discussed with your clinician today.  If any of these changes or additions  "are incorrect, please notify your healthcare provider.             Verify that the below list of medications is an accurate representation of the medications you are currently taking.  If none reported, the list may be blank. If incorrect, please contact your healthcare provider. Carry this list with you in case of emergency.           Current Medications     atenolol (TENORMIN) 25 MG tablet TAKE 1 TABLET BY ORAL ROUTE 1 TIME PER DAY FOR BLOOD PRESSURE, morning.    ciprofloxacin HCl (CIPRO) 500 MG tablet Take 500 mg by mouth 2 (two) times daily.    docusate sodium (COLACE) 50 MG capsule Take 1 capsule (50 mg total) by mouth 2 (two) times daily.    hydrochlorothiazide (HYDRODIURIL) 25 MG tablet TAKE 1 TABLET (25 MG) BY ORAL ROUTE ONCE DAILY, morning    irbesartan (AVAPRO) 150 MG tablet TAKE 1 TABLET BY MOUTH 1 TIME PER DAY, morning    multivitamin (ONE DAILY MULTIVITAMIN) per tablet Take 1 tablet by mouth every morning.    oxycodone-acetaminophen (PERCOCET) 5-325 mg per tablet Take 1 tablet by mouth every 4 (four) hours as needed.    simvastatin (ZOCOR) 40 MG tablet Take 40 mg by mouth every morning.            Clinical Reference Information           Your Vitals Were     BP Pulse Temp Resp Height Weight    225/100 (BP Location: Left arm, Patient Position: Sitting) 90 98.3 °F (36.8 °C) (Oral) 20 6' 2" (1.88 m) 118 kg (260 lb 2.3 oz)    SpO2 BMI             94% 33.4 kg/m2         Allergies as of 1/17/2017     No Known Allergies      Immunizations Administered on Date of Encounter - 1/17/2017     None      ED Micro, Lab, POCT     Start Ordered       Status Ordering Provider    01/17/17 0229 01/17/17 0228  Urinalysis  STAT      Final result     01/17/17 0229 01/17/17 0228  Urine culture **CANNOT BE ORDERED STAT**  Once      In process     01/17/17 0228 01/17/17 0228  Urinalysis Microscopic  Once      Final result       ED Imaging Orders     None      Discharge References/Attachments     URINARY RETENTION, MALE (ENGLISH) "    FLORES CATHETER, CARE (ENGLISH)      [facility] Sign-Up     Activating your [facility] account is as easy as 1-2-3!     1) Visit [email or web address], select Sign Up Now, enter this activation code and your date of birth, then select Next.  [email or web address]  Expires: 3/2/2017 12:07 PM      2) Create a username and password to use when you visit [facility] in the future and select a security question in case you lose your password and select Next.    3) Enter your e-mail address and click Sign Up!    Additional Information  If you have questions, please e-mail [email or web address] or call [phone] to talk to our [facility] staff. Remember, [facility] is NOT to be used for urgent needs. For medical emergencies, dial 911.          [facility] complies with applicable Federal civil rights laws and does not discriminate on the basis of race, color, national origin, age, disability, or sex.        Language Assistance Services     ATTENTION: Language assistance services are available, free of charge. Please call [phone].      ATENCIÓN: Si habla español, tiene a cheema disposición servicios gratuitos de asistencia lingüística. Llame al [phone].     CHÚ Ý: N?u b?n nói Ti?ng Vi?t, có các d?ch v? h? tr? ngôn ng? mi?n phí dành cho b?n. G?i s? [phone].

## 2017-01-17 NOTE — ED NOTES
16Fr straight tip single lumen ford catheter inserted /c ease, 950mL urine output - pt states he feels relieved.  Pink-tinged urine noted.

## 2017-01-18 ENCOUNTER — TELEPHONE (OUTPATIENT)
Dept: UROLOGY | Facility: CLINIC | Age: 72
End: 2017-01-18

## 2017-01-18 ENCOUNTER — OFFICE VISIT (OUTPATIENT)
Dept: UROLOGY | Facility: CLINIC | Age: 72
End: 2017-01-18
Payer: MEDICARE

## 2017-01-18 VITALS
SYSTOLIC BLOOD PRESSURE: 134 MMHG | HEART RATE: 65 BPM | RESPIRATION RATE: 15 BRPM | HEIGHT: 74 IN | WEIGHT: 251 LBS | DIASTOLIC BLOOD PRESSURE: 63 MMHG | BODY MASS INDEX: 32.21 KG/M2

## 2017-01-18 DIAGNOSIS — R33.9 URINARY RETENTION: Primary | ICD-10-CM

## 2017-01-18 DIAGNOSIS — Z46.6 ENCOUNTER FOR FOLEY CATHETER REMOVAL: ICD-10-CM

## 2017-01-18 DIAGNOSIS — Z87.19 S/P HERNIA SURGERY: ICD-10-CM

## 2017-01-18 DIAGNOSIS — Z98.890 S/P HERNIA SURGERY: ICD-10-CM

## 2017-01-18 LAB — BACTERIA UR CULT: NO GROWTH

## 2017-01-18 PROCEDURE — 3075F SYST BP GE 130 - 139MM HG: CPT | Mod: S$GLB,,, | Performed by: NURSE PRACTITIONER

## 2017-01-18 PROCEDURE — 1125F AMNT PAIN NOTED PAIN PRSNT: CPT | Mod: S$GLB,,, | Performed by: NURSE PRACTITIONER

## 2017-01-18 PROCEDURE — 1157F ADVNC CARE PLAN IN RCRD: CPT | Mod: S$GLB,,, | Performed by: NURSE PRACTITIONER

## 2017-01-18 PROCEDURE — 1160F RVW MEDS BY RX/DR IN RCRD: CPT | Mod: S$GLB,,, | Performed by: NURSE PRACTITIONER

## 2017-01-18 PROCEDURE — 99203 OFFICE O/P NEW LOW 30 MIN: CPT | Mod: S$GLB,,, | Performed by: NURSE PRACTITIONER

## 2017-01-18 PROCEDURE — 3078F DIAST BP <80 MM HG: CPT | Mod: S$GLB,,, | Performed by: NURSE PRACTITIONER

## 2017-01-18 PROCEDURE — 1159F MED LIST DOCD IN RCRD: CPT | Mod: S$GLB,,, | Performed by: NURSE PRACTITIONER

## 2017-01-18 PROCEDURE — 99999 PR PBB SHADOW E&M-EST. PATIENT-LVL III: CPT | Mod: PBBFAC,,, | Performed by: NURSE PRACTITIONER

## 2017-01-18 RX ORDER — TAMSULOSIN HYDROCHLORIDE 0.4 MG/1
0.4 CAPSULE ORAL NIGHTLY
Qty: 7 CAPSULE | Refills: 0 | Status: SHIPPED | OUTPATIENT
Start: 2017-01-18 | End: 2017-01-25

## 2017-01-18 NOTE — PROGRESS NOTES
Subjective:       Patient ID: Gibson Moreno is a 71 y.o. male.    Chief Complaint: Urinary Retention (catheter removal)    HPI Comments: Gibson Moreno is a 71 y.o. Male new to urology.  He is here today for urinary retention.  He is s/p Repair left inguinal hernia with mesh & repair umbilical hernia with mesh on 01/16/2017.  He presented next day to ER unable to urinate.   Ford was placed and ~1L urine drained.  He is not taking any BPH meds.  He was told to get ford out as soon as possible.      He is taking narcotic pain meds; last pill was this am.  He having some issues with constipation.  Surgical site is not so painful;    Prior to surgery no issues with urination.  FOS was good. Nocturia was 0.             Past Medical History:    A-fib                                           2002            Comment:required digitalis IV, resolved w no occurence    Disorder of kidney and ureter                   2017            Comment:Stage 3a CKD    Hyperlipidemia                                                Hypertension                                                  No past surgical history on file.    Review of patient's family history indicates:    Cancer                         Mother                    Cancer                         Father                      Social History    Marital status:              Spouse name:                       Years of education:                 Number of children:               Occupational History    None on file    Social History Main Topics    Smoking status: Never Smoker                                                                   Smokeless status: Not on file                       Alcohol use: Yes                Comment: glass of wine occasionally    Drug use: Not on file     Sexual activity: Not on file          Other Topics            Concern    None on file    Social History Narrative    None on file        Allergies:  Review of patient's allergies  indicates no known allergies.    Medications:  Current Outpatient Prescriptions:   atenolol (TENORMIN) 25 MG tablet, TAKE 1 TABLET BY ORAL ROUTE 1 TIME PER DAY FOR BLOOD PRESSURE, morning., Disp: , Rfl: 1  ciprofloxacin HCl (CIPRO) 500 MG tablet, Take 500 mg by mouth 2 (two) times daily., Disp: , Rfl:   docusate sodium (COLACE) 50 MG capsule, Take 1 capsule (50 mg total) by mouth 2 (two) times daily., Disp: , Rfl: 0  hydrochlorothiazide (HYDRODIURIL) 25 MG tablet, TAKE 1 TABLET (25 MG) BY ORAL ROUTE ONCE DAILY, morning, Disp: , Rfl: 1  irbesartan (AVAPRO) 150 MG tablet, TAKE 1 TABLET BY MOUTH 1 TIME PER DAY, morning, Disp: , Rfl: 0  multivitamin (ONE DAILY MULTIVITAMIN) per tablet, Take 1 tablet by mouth every morning., Disp: , Rfl:   oxycodone-acetaminophen (PERCOCET) 5-325 mg per tablet, Take 1 tablet by mouth every 4 (four) hours as needed., Disp: 51 tablet, Rfl: 0  simvastatin (ZOCOR) 40 MG tablet, Take 40 mg by mouth every morning. , Disp: , Rfl:                   Review of Systems   Constitutional: Negative.  Negative for activity change, appetite change and fever.   HENT: Negative.  Negative for facial swelling and trouble swallowing.    Eyes: Negative.    Respiratory: Negative.  Negative for shortness of breath.    Cardiovascular: Negative.  Negative for chest pain and palpitations.   Gastrointestinal: Positive for constipation. Negative for abdominal pain, diarrhea, nausea and vomiting.   Genitourinary: Positive for difficulty urinating. Negative for dysuria, enuresis, flank pain, frequency, genital sores, hematuria, nocturia, penile pain, scrotal swelling, testicular pain and urgency.        Lima draining well.     Musculoskeletal: Negative for back pain, gait problem and neck stiffness.   Skin: Positive for wound.        Surgical site healing; little discomfort.     Neurological: Negative for dizziness, tremors, seizures, syncope, speech difficulty, light-headedness and headaches.    Hematological: Does not bruise/bleed easily.   Psychiatric/Behavioral: Negative for confusion and hallucinations. The patient is not nervous/anxious.        Objective:      Physical Exam   Nursing note and vitals reviewed.  Constitutional: He is oriented to person, place, and time. He appears well-developed and well-nourished.  Non-toxic appearance. He does not have a sickly appearance.   HENT:   Head: Normocephalic and atraumatic.   Right Ear: External ear normal.   Left Ear: External ear normal.   Nose: Nose normal.   Mouth/Throat: Mucous membranes are normal.   Eyes: Conjunctivae and lids are normal. No scleral icterus.   Neck: Trachea normal, normal range of motion and full passive range of motion without pain. Neck supple. No JVD present. No tracheal deviation present.   Cardiovascular: Normal rate, regular rhythm, S1 normal and S2 normal.    Pulmonary/Chest: Effort normal and breath sounds normal. No respiratory distress. He exhibits no tenderness.   Abdominal: Soft. Normal appearance and bowel sounds are normal. There is no hepatosplenomegaly. There is no tenderness. There is no rebound, no guarding and no CVA tenderness.   Musculoskeletal: Normal range of motion.   Neurological: He is alert and oriented to person, place, and time. He has normal strength.   Skin: Skin is warm, dry and intact.     Psychiatric: He has a normal mood and affect. His behavior is normal. Judgment and thought content normal.       Assessment:       1. Urinary retention    2. S/P hernia surgery    3. Encounter for Lima catheter removal        Plan:         I spent 30 minutes with the patient of which more than half was spent in direct consultation with the patient in regards to our treatment and plan.    Education and recommendations of today's plan of care including home remedies.  We discussed contributing factors for his urinary retention;  Discussed pain meds and effects on the body;   Need better stool softener to prevent  constipation.  Risks and chances of urinating today; discussed VT.  He passed VT in office.  Post ford removal expectations discussed.  Flomax daily x 7 days.  F/u prn

## 2017-01-18 NOTE — LETTER
January 18, 2017      Brandan Watts MD  [address] [facility] [address] [address] [facility] [address]           [facility]  [address]  [place] [address]  Phone: [phone]          Patient: Gibson Moreno   MR Number: [id number]   YOB: 1945   Date of Visit: 1/18/2017       Dear Dr. Brandan Watts:    Thank you for referring Gibson Moreno to me for evaluation. Attached you will find relevant portions of my assessment and plan of care.    If you have questions, please do not hesitate to call me. I look forward to following Gibson Moreno along with you.    Sincerely,    Adore Lombardi, NP    Enclosure  CC:  No Recipients    If you would like to receive this communication electronically, please contact [email or web address] or [phone] to request more information on [employer] Link access.    For providers and/or their staff who would like to refer a patient to [facility], please contact us through our one-stop-shop provider referral line, [facility] , at [phone].    If you feel you have received this communication in error or would no longer like to receive these types of communications, please e-mail [email or web address]

## 2017-01-18 NOTE — TELEPHONE ENCOUNTER
"----- Message from Mariluz Ortiz sent at 1/17/2017 12:27 PM CST -----  Contact: PATIENT HIMSELF  X  1st Request  _  2nd Request  _  3rd Request    Who: CELESTE DALY  (MRN# [id number])    Why: Patient called requesting to be seen in 3 days.  Says, "he was seen in the ED an a catheter was inserted."  I did attempt to schedule per request but was unsuccessful.  Please give patient a call at your earliest convenience.  THANKS!    What Number to Call Back:  ([phone])    When to Expect a call back: (Before the end of the day)   -- if call after 3:00 call back will be tomorrow.                        "

## 2017-01-18 NOTE — MR AVS SNAPSHOT
[facility]  [address]  [facility] [address]  Phone: [phone]                  Gibson Moreno   2017 1:00 PM   Office Visit    Description:  Male : 1945   Provider:  Adore Lombardi NP   Department:  [facility]           Reason for Visit     Urinary Retention           Diagnoses this Visit        Comments    S/P hernia surgery    -  Primary     Urinary retention         Encounter for L[place] catheter removal                To Do List           Future Appointments        Provider Department Dept Phone    2017 1:00 PM DELPHINE [facility] [phone]      Goals (5 Years of Data)     None      [facility]     [facility] On Call Nurse Care Line -  Assistance  Registered nurses in the [facility] On Call Center provide clinical advisement, health education, appointment booking, and other advisory services.  Call for this free service at [phone].             Medications           Message regarding Medications     Verify the changes and/or additions to your medication regime listed below are the same as discussed with your clinician today.  If any of these changes or additions are incorrect, please notify your healthcare provider.             Verify that the below list of medications is an accurate representation of the medications you are currently taking.  If none reported, the list may be blank. If incorrect, please contact your healthcare provider. Carry this list with you in case of emergency.           Current Medications     atenolol (TENORMIN) 25 MG tablet TAKE 1 TABLET BY ORAL ROUTE 1 TIME PER DAY FOR BLOOD PRESSURE, morning.    ciprofloxacin HCl (CIPRO) 500 MG tablet Take 500 mg by mouth 2 (two) times daily.    docusate sodium (COLACE) 50 MG capsule Take 1 capsule (50 mg total) by mouth 2 (two) times daily.    hydrochlorothiazide (HYDRODIURIL) 25 MG tablet TAKE 1 TABLET (25 MG) BY ORAL ROUTE ONCE DAILY,  "morning    irbesartan (AVAPRO) 150 MG tablet TAKE 1 TABLET BY MOUTH 1 TIME PER DAY, morning    multivitamin (ONE DAILY MULTIVITAMIN) per tablet Take 1 tablet by mouth every morning.    oxycodone-acetaminophen (PERCOCET) 5-325 mg per tablet Take 1 tablet by mouth every 4 (four) hours as needed.    simvastatin (ZOCOR) 40 MG tablet Take 40 mg by mouth every morning.            Clinical Reference Information           Vital Signs - Last Recorded  Most recent update: 1/18/2017  8:42 AM by Katy Palacios RN    BP Pulse Resp Ht Wt BMI    134/63 (BP Location: Right arm, Patient Position: Sitting, BP Method: Automatic) 65 15 6' 2" (1.88 m) 113.9 kg (251 lb) 32.23 kg/m2      Blood Pressure          Most Recent Value    BP  134/63      Allergies as of 1/18/2017     No Known Allergies      Immunizations Administered on Date of Encounter - 1/18/2017     None      Orders Placed During Today's Visit      Normal Orders This Visit    Voiding Trial       [facility] Sign-Up     Activating your [facility] account is as easy as 1-2-3!     1) Visit [email or web address], select Sign Up Now, enter this activation code and your date of birth, then select Next.  [email or web address]  Expires: 3/2/2017 12:07 PM      2) Create a username and password to use when you visit [facility] in the future and select a security question in case you lose your password and select Next.    3) Enter your e-mail address and click Sign Up!    Additional Information  If you have questions, please e-mail [email or web address] or call [phone] to talk to our [facility] staff. Remember, [facility] is NOT to be used for urgent needs. For medical emergencies, dial 911.         Instructions      Urinary Retention (Male)  Urinary retention is the medical term for difficulty or inability to pass urine, even though your bladder is full.  Causes  The most common cause of urinary retention in men is the bladder outlet being blocked. This can be due to an enlarged prostate gland " or a bladder infection. Certain medicines can also cause this problem. This condition is more likely to occur as men get older.    This condition is treated by insertion of a catheter into the bladder to drain the urine. This provides immediate relief. The catheter may need to remain in place for a few days to prevent a recurrence. The catheter has a balloon on the tip which was inflated after insertion. This prevents the catheter from falling out.  Symptoms  Common symptoms of urinary retention include:  · Pain (not experienced by everyone)  · Frequent urination  · Feeling that the bladder is still full after urinating  · Incontinence (not being able to control the release of urine)  · Swollen abdomen  Treatment  This condition is treated by inserting a tube (catheter) into the bladder to drain the urine. This provides immediate relief. The catheter may need to stay in place for a few days. The catheter has a balloon on the tip, which is inflated after insertion. This prevents the catheter from falling out.  Home care  · If you were given antibiotics, take them until they are used up, or your healthcare provider tells you to stop. It is important to finish the antibiotics even though you feel better. This is to make sure your infection has cleared.  · If a catheter was left in place, it is important to keep bacteria from getting into the collection bag. Do not disconnect the catheter from the collection bag.  · Use a leg band to secure the drainage tube, so it does not pull on the catheter. Drain the collection bag when it becomes full using the drain spout at the bottom of the bag.  · Do not pull on or try to remove your catheter. This will injure your urethra. The catheter must be removed by a healthcare provider.  Follow-up care  Follow up with your healthcare provider, or as advised.  If a catheter was left in place, it can usually be removed within 3 to 7 days. Some conditions require the catheter to stay in  longer. Your healthcare provider will tell you when to return to have the catheter removed.  When to seek medical advice  Call your healthcare provider right away if any of these occur:  · Fever of 100.4ºF (38ºC) or higher, or as directed by your healthcare provider  · Bladder or lower-abdominal pain or fullness  · Abdominal swelling, nausea, vomiting, or back pain  · Blood or urine leakage around the catheter  · Bloody urine coming from the catheter (if a new symptom)  · Weakness, dizziness, or fainting  · Confusion or change in usual level of alertness  · If a catheter was left in place, return if:  ¨ Catheter falls out  ¨ Catheter stops draining for 6 hours  © 7764-2582 [employer]. [address] [place] [address], [place] [address]. All rights reserved. This information is not intended as a substitute for professional medical care. Always follow your healthcare professional's instructions.        BPH (Enlarged Prostate)  The prostate is a gland at the base of the bladder. As some men get older, the prostate may get bigger in size. This problem is called benign prostatic hyperplasia (BPH). BPH puts pressure on the urethra. This is the tube that carries urine from the bladder to the penis. It may interfere with the flow of urine. It may also keep the bladder from emptying fully.    Symptoms of BPH include trouble starting urination and feeling as though the bladder isnt emptying all the way. It also includes a weak urine stream, dribbling and leaking of urine, and frequent and urgent urination (especially at night). BPH can increase the risk of urinary infections. It can also block off urine flow completely. If this occurs, a thin tube (catheter) may be passed into the bladder to help drain urine.  If symptoms are mild, no treatment may be needed right now. If symptoms are more severe, treatment is likely needed. The goal of treatment is to improve urine flow and reduce symptoms. Treatments can include  medicine and procedures. Your healthcare provider will discuss treatment options with you as needed.  Home care  The following guidelines will help you care for yourself at home:  · Urinate as soon as you feel the urge. Don't try to hold your urine.  · Don't limit your fluid intake during the day. Drink 6 to 8 glasses of water or liquids a day. This prevents bacteria from building up in the bladder.  · Avoid drinking fluids after dinner to help reduce urination during the night.  · Avoid medicines that can worsen your symptoms. These include certain cold and allergy medicines and antidepressants. Diuretics used for high blood pressure can also worsen symptoms. Talk to your doctor about the medicines you take. Other choices may work better for you.  Prostate cancer screening  BPH does not increase the risk of prostate cancer. But because prostate cancer is a common cancer in men, screening is sometimes recommended. This may help detect the cancer in its early stages when treatment is most effective. Factors that can increase the risk of prostate cancer include being -American or having a father or brother who had prostate cancer. A high-fat diet may also increase the risk of prostate cancer. Talk to your healthcare provider to see whether you should be screened for prostate cancer.  Follow-up care  Follow up with your healthcare provider, or as advised  To learn more, go to:  · National Kidney & Urologic Diseases Information Clearinghouse  kidney.niddk.nih.gov, 877.981.5576  When to seek medical advice  Call your healthcare provider right away if any of these occur:  · Fever of 100.4°F (38.0°C) or higher, or as advised  · Unable to pass urine for 8 hours  · Increasing pressure or pain in your bladder (lower abdomen)  · Blood in the urine  · Increasing low back pain, not related to injury  · Symptoms of urinary infection (increased urge to urinate, burning when passing urine, foul-smelling urine)  © 2880-8262  [employer]. [address] [place] [address], [place] [address]. All rights reserved. This information is not intended as a substitute for professional medical care. Always follow your healthcare professional's instructions.

## 2017-01-18 NOTE — PATIENT INSTRUCTIONS
Urinary Retention (Male)  Urinary retention is the medical term for difficulty or inability to pass urine, even though your bladder is full.  Causes  The most common cause of urinary retention in men is the bladder outlet being blocked. This can be due to an enlarged prostate gland or a bladder infection. Certain medicines can also cause this problem. This condition is more likely to occur as men get older.    This condition is treated by insertion of a catheter into the bladder to drain the urine. This provides immediate relief. The catheter may need to remain in place for a few days to prevent a recurrence. The catheter has a balloon on the tip which was inflated after insertion. This prevents the catheter from falling out.  Symptoms  Common symptoms of urinary retention include:  · Pain (not experienced by everyone)  · Frequent urination  · Feeling that the bladder is still full after urinating  · Incontinence (not being able to control the release of urine)  · Swollen abdomen  Treatment  This condition is treated by inserting a tube (catheter) into the bladder to drain the urine. This provides immediate relief. The catheter may need to stay in place for a few days. The catheter has a balloon on the tip, which is inflated after insertion. This prevents the catheter from falling out.  Home care  · If you were given antibiotics, take them until they are used up, or your healthcare provider tells you to stop. It is important to finish the antibiotics even though you feel better. This is to make sure your infection has cleared.  · If a catheter was left in place, it is important to keep bacteria from getting into the collection bag. Do not disconnect the catheter from the collection bag.  · Use a leg band to secure the drainage tube, so it does not pull on the catheter. Drain the collection bag when it becomes full using the drain spout at the bottom of the bag.  · Do not pull on or try to remove your catheter.  This will injure your urethra. The catheter must be removed by a healthcare provider.  Follow-up care  Follow up with your healthcare provider, or as advised.  If a catheter was left in place, it can usually be removed within 3 to 7 days. Some conditions require the catheter to stay in longer. Your healthcare provider will tell you when to return to have the catheter removed.  When to seek medical advice  Call your healthcare provider right away if any of these occur:  · Fever of 100.4ºF (38ºC) or higher, or as directed by your healthcare provider  · Bladder or lower-abdominal pain or fullness  · Abdominal swelling, nausea, vomiting, or back pain  · Blood or urine leakage around the catheter  · Bloody urine coming from the catheter (if a new symptom)  · Weakness, dizziness, or fainting  · Confusion or change in usual level of alertness  · If a catheter was left in place, return if:  ¨ Catheter falls out  ¨ Catheter stops draining for 6 hours  © 0199-7512 [employer]. [address] [place] [address]. All rights reserved. This information is not intended as a substitute for professional medical care. Always follow your healthcare professional's instructions.        BPH (Enlarged Prostate)  The prostate is a gland at the base of the bladder. As some men get older, the prostate may get bigger in size. This problem is called benign prostatic hyperplasia (BPH). BPH puts pressure on the urethra. This is the tube that carries urine from the bladder to the penis. It may interfere with the flow of urine. It may also keep the bladder from emptying fully.    Symptoms of BPH include trouble starting urination and feeling as though the bladder isnt emptying all the way. It also includes a weak urine stream, dribbling and leaking of urine, and frequent and urgent urination (especially at night). BPH can increase the risk of urinary infections. It can also block off urine flow completely. If this occurs, a  thin tube (catheter) may be passed into the bladder to help drain urine.  If symptoms are mild, no treatment may be needed right now. If symptoms are more severe, treatment is likely needed. The goal of treatment is to improve urine flow and reduce symptoms. Treatments can include medicine and procedures. Your healthcare provider will discuss treatment options with you as needed.  Home care  The following guidelines will help you care for yourself at home:  · Urinate as soon as you feel the urge. Don't try to hold your urine.  · Don't limit your fluid intake during the day. Drink 6 to 8 glasses of water or liquids a day. This prevents bacteria from building up in the bladder.  · Avoid drinking fluids after dinner to help reduce urination during the night.  · Avoid medicines that can worsen your symptoms. These include certain cold and allergy medicines and antidepressants. Diuretics used for high blood pressure can also worsen symptoms. Talk to your doctor about the medicines you take. Other choices may work better for you.  Prostate cancer screening  BPH does not increase the risk of prostate cancer. But because prostate cancer is a common cancer in men, screening is sometimes recommended. This may help detect the cancer in its early stages when treatment is most effective. Factors that can increase the risk of prostate cancer include being -American or having a father or brother who had prostate cancer. A high-fat diet may also increase the risk of prostate cancer. Talk to your healthcare provider to see whether you should be screened for prostate cancer.  Follow-up care  Follow up with your healthcare provider, or as advised  To learn more, go to:  · National Kidney & Urologic Diseases Information Clearinghouse  kidney.niddk.nih.gov, 709.710.5848  When to seek medical advice  Call your healthcare provider right away if any of these occur:  · Fever of 100.4°F (38.0°C) or higher, or as advised  · Unable to  pass urine for 8 hours  · Increasing pressure or pain in your bladder (lower abdomen)  · Blood in the urine  · Increasing low back pain, not related to injury  · Symptoms of urinary infection (increased urge to urinate, burning when passing urine, foul-smelling urine)  © 6029-1193 [employer]. [address] [place] [address], [place] [address]. All rights reserved. This information is not intended as a substitute for professional medical care. Always follow your healthcare professional's instructions.

## 2017-02-07 ENCOUNTER — OFFICE VISIT (OUTPATIENT)
Dept: SURGERY | Facility: CLINIC | Age: 72
End: 2017-02-07
Payer: MEDICARE

## 2017-02-07 VITALS
SYSTOLIC BLOOD PRESSURE: 124 MMHG | HEIGHT: 74 IN | TEMPERATURE: 99 F | WEIGHT: 251 LBS | DIASTOLIC BLOOD PRESSURE: 71 MMHG | HEART RATE: 58 BPM | BODY MASS INDEX: 32.21 KG/M2

## 2017-02-07 DIAGNOSIS — Z09 POSTOP CHECK: Primary | ICD-10-CM

## 2017-02-07 PROBLEM — K40.90 LEFT INGUINAL HERNIA: Status: RESOLVED | Noted: 2017-01-03 | Resolved: 2017-02-07

## 2017-02-07 PROBLEM — K40.90 INGUINAL HERNIA UNILATERAL, NON-RECURRENT: Status: RESOLVED | Noted: 2017-01-16 | Resolved: 2017-02-07

## 2017-02-07 PROBLEM — K42.9 UMBILICAL HERNIA WITHOUT OBSTRUCTION AND WITHOUT GANGRENE: Status: RESOLVED | Noted: 2017-01-03 | Resolved: 2017-02-07

## 2017-02-07 PROCEDURE — 99024 POSTOP FOLLOW-UP VISIT: CPT | Mod: S$GLB,,, | Performed by: SURGERY

## 2017-02-07 PROCEDURE — 99999 PR PBB SHADOW E&M-EST. PATIENT-LVL III: CPT | Mod: PBBFAC,,, | Performed by: SURGERY

## 2017-02-07 NOTE — PROGRESS NOTES
"Gibson Moreno is a 71 y.o. male patient.   1. Postop check      Past Medical History   Diagnosis Date    A-fib 2002     required digitalis IV, resolved w no occurence    Disorder of kidney and ureter 2017     Stage 3a CKD    Hyperlipidemia     Hypertension      Past Surgical History Pertinent Negatives   Procedure Date Noted    Tonsillectomy 1/17/2017    Appendectomy 1/17/2017     Scheduled Meds:  Continuous Infusions:  PRN Meds:    Review of patient's allergies indicates:  No Known Allergies  There are no hospital problems to display for this patient.    Blood pressure 124/71, pulse (!) 58, temperature 98.5 °F (36.9 °C), height 6' 2" (1.88 m), weight 113.9 kg (251 lb).    Subjective S/p open lih and umbilical hernia repair with mesh 1/16/17.  He did go to the ER postop for urinary retention but has done well since.  Today he only has some postop soreness and no other complaints.  Objective Wounds clear, abdomen benign.   Assessment & Plan He is happy with his procedure.  Light duty 3 more weeks and follow up prn.       Brandan Watts MD  2/7/2017  "

## 2017-02-07 NOTE — LETTER
[facility] - General Surgery  [address]  [place] [address]  Phone: [phone] February 7, 2017      Arben Kelly MD  [address] [facility] [address]    Patient: Gibson Moreno   MR Number: [id number]   YOB: 1945   Date of Visit: 2/7/2017     Dear Dr. Kelly:    Thank you for referring Gibson Moreno to me for evaluation. Below are the relevant portions of my assessment and plan of care.    Gibson Moreno is a 71 y.o. male patient open LIH and umbilical hernia repair with mesh 1/16/17. He did go to the ER postop for urinary retention, but has done well since. Today he only has some postop soreness and no other complaints.  He is happy with his procedure.     PLAN:  Light duty 3 more weeks and follow up PRN.    If you have questions, please do not hesitate to call me. I look forward to following Gibson along with you.    Sincerely,      Brandan Watts MD   Section Head - General, Laparoscopic, Bariatric  Acute Care and Oncologic Surgery   - Surgical Weight Loss Program  [facility]    WSR/yessica

## 2017-10-03 ENCOUNTER — OFFICE VISIT (OUTPATIENT)
Dept: DERMATOLOGY | Facility: CLINIC | Age: 72
End: 2017-10-03
Payer: MEDICARE

## 2017-10-03 DIAGNOSIS — L72.9 CYST OF SKIN: ICD-10-CM

## 2017-10-03 PROBLEM — L72.0 EIC (EPIDERMAL INCLUSION CYST): Status: ACTIVE | Noted: 2017-10-03

## 2017-10-03 PROCEDURE — 99999 PR PBB SHADOW E&M-EST. PATIENT-LVL II: CPT | Mod: PBBFAC,,, | Performed by: DERMATOLOGY

## 2017-10-03 PROCEDURE — 99202 OFFICE O/P NEW SF 15 MIN: CPT | Mod: S$GLB,,, | Performed by: DERMATOLOGY

## 2017-10-03 NOTE — PROGRESS NOTES
Subjective:       Patient ID:  Gibson Moreno is a 72 y.o. male who presents for   Chief Complaint   Patient presents with    Cyst     on neck x 30 yrs asymp      Cyst  - Initial  Affected locations: neck  Duration: 30 years  Signs / symptoms: asymptomatic and growing (Prior to excision 30 years ago, cyst drained foul-smelling white material and pus. It was also tender and swollen. He denies any of these symptoms since initial excision 30 years ago. )  Treatments tried: had removed surgically.      Past Medical History:   Diagnosis Date    A-fib 2002    required digitalis IV, resolved w no occurence    Disorder of kidney and ureter 2017    Stage 3a CKD    Hyperlipidemia     Hypertension      Review of Systems   Constitutional: Negative for fever, chills and fatigue.   Skin: Negative for itching.        Denies personal history of skin cancer.         Objective:    Physical Exam   Constitutional: He appears well-developed and well-nourished. No distress.   Neurological: He is alert and oriented to person, place, and time. He is not disoriented.   Psychiatric: He has a normal mood and affect.   Skin:   Areas Examined (abnormalities noted in diagram):   Scalp / Hair Palpated and Inspected  Head / Face Inspection Performed  Neck Inspection Performed  RUE Inspected  LUE Inspection Performed             Diagram Legend     Erythematous scaling macule/papule c/w actinic keratosis       Vascular papule c/w angioma      Pigmented verrucoid papule/plaque c/w seborrheic keratosis      Yellow umbilicated papule c/w sebaceous hyperplasia      Irregularly shaped tan macule c/w lentigo     1-2 mm smooth white papules consistent with Milia      Movable subcutaneous cyst with punctum c/w epidermal inclusion cyst      Subcutaneous movable cyst c/w pilar cyst      Firm pink to brown papule c/w dermatofibroma      Pedunculated fleshy papule(s) c/w skin tag(s)      Evenly pigmented macule c/w junctional nevus     Mildly  variegated pigmented, slightly irregular-bordered macule c/w mildly atypical nevus      Flesh colored to evenly pigmented papule c/w intradermal nevus       Pink pearly papule/plaque c/w basal cell carcinoma      Erythematous hyperkeratotic cursted plaque c/w SCC      Surgical scar with no sign of skin cancer recurrence      Open and closed comedones      Inflammatory papules and pustules      Verrucoid papule consistent consistent with wart     Erythematous eczematous patches and plaques     Dystrophic onycholytic nail with subungual debris c/w onychomycosis     Umbilicated papule    Erythematous-base heme-crusted tan verrucoid plaque consistent with inflamed seborrheic keratosis     Erythematous Silvery Scaling Plaque c/w Psoriasis     See annotation      Assessment / Plan:        Cyst of skin (posterior neck)   Reassurance given to patient. No treatment is necessary.   Discussed treatment options - excision by general surgery due to size of cyst vs observation. Cysts may recur with excision. Pt will defer treatment at this time.     F/u PRN

## 2017-10-03 NOTE — LETTER
October 3, 2017      Arben Kelly MD  [address] [facility] [address]           [facility]  [address]  [place] [address]  Phone: [phone]  Fax: [phone]          Patient: Gibson Moreno   MR Number: [id number]   YOB: 1945   Date of Visit: 10/3/2017       Dear Dr. Arben Kelly:    Thank you for referring Gibson Moreno to me for evaluation. Attached you will find relevant portions of my assessment and plan of care.    If you have questions, please do not hesitate to call me. I look forward to following Gibson Moreno along with you.    Sincerely,    Jennifer Doan MD    Enclosure  CC:  No Recipients    If you would like to receive this communication electronically, please contact [email or web address] or [phone] to request more information on [email or web address] Link access.    For providers and/or their staff who would like to refer a patient to [facility], please contact us through our one-stop-shop provider referral line, [facility], at [phone].    If you feel you have received this communication in error or would no longer like to receive these types of communications, please e-mail [email or web address]

## 2022-05-05 ENCOUNTER — OFFICE VISIT (OUTPATIENT)
Dept: URBAN - METROPOLITAN AREA CLINIC 63 | Facility: CLINIC | Age: 77
End: 2022-05-05
Payer: MEDICARE

## 2022-05-05 DIAGNOSIS — H43.813 VITREOUS DEGENERATION, BILATERAL: ICD-10-CM

## 2022-05-05 DIAGNOSIS — H52.03 HYPERMETROPIA, BILATERAL: ICD-10-CM

## 2022-05-05 DIAGNOSIS — H04.123 DRY EYE SYNDROME OF BILATERAL LACRIMAL GLANDS: ICD-10-CM

## 2022-05-05 DIAGNOSIS — H25.813 COMBINED FORMS OF AGE-RELATED CATARACT, BILATERAL: Primary | ICD-10-CM

## 2022-05-05 PROCEDURE — 99204 OFFICE O/P NEW MOD 45 MIN: CPT | Performed by: OPTOMETRIST

## 2022-05-05 ASSESSMENT — VISUAL ACUITY
OS: 20/25
OD: 20/25

## 2022-05-05 ASSESSMENT — INTRAOCULAR PRESSURE
OS: 12
OD: 12

## 2024-05-14 ENCOUNTER — OFFICE VISIT (OUTPATIENT)
Dept: URBAN - NONMETROPOLITAN AREA CLINIC 7 | Facility: CLINIC | Age: 79
End: 2024-05-14
Payer: MEDICARE

## 2024-05-14 DIAGNOSIS — H25.813 COMBINED FORMS OF AGE-RELATED CATARACT, BILATERAL: ICD-10-CM

## 2024-05-14 DIAGNOSIS — H35.371 PUCKERING OF MACULA, RIGHT EYE: ICD-10-CM

## 2024-05-14 DIAGNOSIS — H04.123 TEAR FILM INSUFFICIENCY OF BILATERAL LACRIMAL GLANDS: ICD-10-CM

## 2024-05-14 DIAGNOSIS — H52.03 HYPERMETROPIA, BILATERAL: Primary | ICD-10-CM

## 2024-05-14 PROCEDURE — 92014 COMPRE OPH EXAM EST PT 1/>: CPT | Performed by: OPTOMETRIST

## 2024-05-14 ASSESSMENT — VISUAL ACUITY
OS: 20/25
OD: 20/25

## 2024-05-14 ASSESSMENT — INTRAOCULAR PRESSURE
OS: 14
OD: 14

## (undated) DEVICE — SUT VICRYL CTD 2-0 GI 27 SH

## (undated) DEVICE — TAPE SILK 3IN

## (undated) DEVICE — GAUZE SPONGE 4X4 12PLY

## (undated) DEVICE — SPONGE DERMACEA 4X4IN 12PLY

## (undated) DEVICE — SEE MEDLINE ITEM 157148

## (undated) DEVICE — SEE MEDLINE ITEM 157117

## (undated) DEVICE — DRAIN PENROSE XRAY 12 X 1/4 ST

## (undated) DEVICE — ADHESIVE MASTISOL VIAL 48/BX

## (undated) DEVICE — DRESSING TRANS 4X4 TEGADERM

## (undated) DEVICE — SUT SILK 3-0 SH 18IN BLACK

## (undated) DEVICE — SOL NS 1000CC

## (undated) DEVICE — TRAY MINOR GEN SURG

## (undated) DEVICE — SUT 2-0 VICRYL / SH (J417)

## (undated) DEVICE — NDL HYPO REG 25G X 1 1/2

## (undated) DEVICE — CLOSURE SKIN STERI STRIP 1/2X4

## (undated) DEVICE — GOWN SURGICAL X-LARGE

## (undated) DEVICE — APPLICATOR CHLORAPREP ORN 26ML

## (undated) DEVICE — DRESSING TELFA N ADH 3X8

## (undated) DEVICE — SUT VICRYL PLUS 4-0 P3 18IN

## (undated) DEVICE — DRESSING TELFA STRL 4X3 LF

## (undated) DEVICE — SEE MEDLINE ITEM 152622

## (undated) DEVICE — SUT VICRYL 3-0 27 SH

## (undated) DEVICE — ELECTRODE REM PLYHSV RETURN 9